# Patient Record
Sex: MALE | Race: WHITE | NOT HISPANIC OR LATINO | URBAN - METROPOLITAN AREA
[De-identification: names, ages, dates, MRNs, and addresses within clinical notes are randomized per-mention and may not be internally consistent; named-entity substitution may affect disease eponyms.]

---

## 2017-01-06 ENCOUNTER — AMBULATORY - HEALTHEAST (OUTPATIENT)
Dept: GERIATRICS | Facility: CLINIC | Age: 82
End: 2017-01-06

## 2017-01-25 ENCOUNTER — OFFICE VISIT - HEALTHEAST (OUTPATIENT)
Dept: GERIATRICS | Facility: CLINIC | Age: 82
End: 2017-01-25

## 2017-01-25 DIAGNOSIS — E55.9 VITAMIN D DEFICIENCY: ICD-10-CM

## 2017-01-25 DIAGNOSIS — G62.9 NEUROPATHY: ICD-10-CM

## 2017-01-25 DIAGNOSIS — S32.409D: ICD-10-CM

## 2017-01-25 DIAGNOSIS — G30.1 LATE ONSET ALZHEIMER'S DISEASE WITHOUT BEHAVIORAL DISTURBANCE (H): ICD-10-CM

## 2017-01-25 DIAGNOSIS — N40.0 ENLARGED PROSTATE: ICD-10-CM

## 2017-01-25 DIAGNOSIS — F02.80 LATE ONSET ALZHEIMER'S DISEASE WITHOUT BEHAVIORAL DISTURBANCE (H): ICD-10-CM

## 2017-02-08 ENCOUNTER — OFFICE VISIT - HEALTHEAST (OUTPATIENT)
Dept: GERIATRICS | Facility: CLINIC | Age: 82
End: 2017-02-08

## 2017-02-08 DIAGNOSIS — F02.80 LATE ONSET ALZHEIMER'S DISEASE WITHOUT BEHAVIORAL DISTURBANCE (H): ICD-10-CM

## 2017-02-08 DIAGNOSIS — M81.0 OP (OSTEOPOROSIS): ICD-10-CM

## 2017-02-08 DIAGNOSIS — S32.409D: ICD-10-CM

## 2017-02-08 DIAGNOSIS — E78.2 COMBINED FAT AND CARBOHYDRATE INDUCED HYPERLIPEMIA: ICD-10-CM

## 2017-02-08 DIAGNOSIS — I10 ESSENTIAL HYPERTENSION WITH GOAL BLOOD PRESSURE LESS THAN 140/90: ICD-10-CM

## 2017-02-08 DIAGNOSIS — M15.0 PRIMARY OSTEOARTHRITIS INVOLVING MULTIPLE JOINTS: ICD-10-CM

## 2017-02-08 DIAGNOSIS — G30.1 LATE ONSET ALZHEIMER'S DISEASE WITHOUT BEHAVIORAL DISTURBANCE (H): ICD-10-CM
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
## 2017-03-22 ENCOUNTER — OFFICE VISIT - HEALTHEAST (OUTPATIENT)
Dept: GERIATRICS | Facility: CLINIC | Age: 82
End: 2017-03-22

## 2017-03-22 DIAGNOSIS — M25.519: ICD-10-CM

## 2017-03-22 DIAGNOSIS — I10 ESSENTIAL HYPERTENSION WITH GOAL BLOOD PRESSURE LESS THAN 140/90: ICD-10-CM

## 2017-03-22 DIAGNOSIS — S32.409D: ICD-10-CM

## 2017-03-22 DIAGNOSIS — N40.0 ENLARGED PROSTATE: ICD-10-CM

## 2017-03-22 DIAGNOSIS — F02.80 LATE ONSET ALZHEIMER'S DISEASE WITHOUT BEHAVIORAL DISTURBANCE (H): ICD-10-CM

## 2017-03-22 DIAGNOSIS — G30.1 LATE ONSET ALZHEIMER'S DISEASE WITHOUT BEHAVIORAL DISTURBANCE (H): ICD-10-CM

## 2017-03-22 DIAGNOSIS — R29.898 LEG WEAKNESS: ICD-10-CM

## 2017-04-12 ENCOUNTER — OFFICE VISIT - HEALTHEAST (OUTPATIENT)
Dept: GERIATRICS | Facility: CLINIC | Age: 82
End: 2017-04-12

## 2017-04-12 DIAGNOSIS — M15.0 PRIMARY OSTEOARTHRITIS INVOLVING MULTIPLE JOINTS: ICD-10-CM

## 2017-04-12 DIAGNOSIS — M81.0 OP (OSTEOPOROSIS): ICD-10-CM

## 2017-04-12 DIAGNOSIS — G30.1 LATE ONSET ALZHEIMER'S DISEASE WITHOUT BEHAVIORAL DISTURBANCE (H): ICD-10-CM

## 2017-04-12 DIAGNOSIS — E78.2 COMBINED FAT AND CARBOHYDRATE INDUCED HYPERLIPEMIA: ICD-10-CM

## 2017-04-12 DIAGNOSIS — F02.80 LATE ONSET ALZHEIMER'S DISEASE WITHOUT BEHAVIORAL DISTURBANCE (H): ICD-10-CM

## 2017-04-12 DIAGNOSIS — I10 ESSENTIAL HYPERTENSION WITH GOAL BLOOD PRESSURE LESS THAN 140/90: ICD-10-CM

## 2017-04-12 DIAGNOSIS — S32.409D: ICD-10-CM
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
## 2017-04-20 ENCOUNTER — AMBULATORY - HEALTHEAST (OUTPATIENT)
Dept: GERIATRICS | Facility: CLINIC | Age: 82
End: 2017-04-20

## 2017-04-20 RX ORDER — ALBUTEROL SULFATE 0.83 MG/ML
2.5 SOLUTION RESPIRATORY (INHALATION) EVERY 4 HOURS PRN
Status: SHIPPED | COMMUNITY
Start: 2017-04-20

## 2017-05-24 ENCOUNTER — OFFICE VISIT - HEALTHEAST (OUTPATIENT)
Dept: GERIATRICS | Facility: CLINIC | Age: 82
End: 2017-05-24

## 2017-05-24 DIAGNOSIS — G30.1 LATE ONSET ALZHEIMER'S DISEASE WITHOUT BEHAVIORAL DISTURBANCE (H): ICD-10-CM

## 2017-05-24 DIAGNOSIS — G62.9 NEUROPATHY: ICD-10-CM

## 2017-05-24 DIAGNOSIS — I10 ESSENTIAL HYPERTENSION WITH GOAL BLOOD PRESSURE LESS THAN 140/90: ICD-10-CM

## 2017-05-24 DIAGNOSIS — M15.0 PRIMARY OSTEOARTHRITIS INVOLVING MULTIPLE JOINTS: ICD-10-CM

## 2017-05-24 DIAGNOSIS — S32.409D: ICD-10-CM

## 2017-05-24 DIAGNOSIS — F02.80 LATE ONSET ALZHEIMER'S DISEASE WITHOUT BEHAVIORAL DISTURBANCE (H): ICD-10-CM

## 2017-06-14 ENCOUNTER — OFFICE VISIT - HEALTHEAST (OUTPATIENT)
Dept: GERIATRICS | Facility: CLINIC | Age: 82
End: 2017-06-14

## 2017-06-14 DIAGNOSIS — S32.409D: ICD-10-CM

## 2017-06-14 DIAGNOSIS — F02.80 LATE ONSET ALZHEIMER'S DISEASE WITHOUT BEHAVIORAL DISTURBANCE (H): ICD-10-CM

## 2017-06-14 DIAGNOSIS — G30.1 LATE ONSET ALZHEIMER'S DISEASE WITHOUT BEHAVIORAL DISTURBANCE (H): ICD-10-CM

## 2017-06-14 DIAGNOSIS — M15.0 PRIMARY OSTEOARTHRITIS INVOLVING MULTIPLE JOINTS: ICD-10-CM

## 2017-06-14 DIAGNOSIS — E78.2 COMBINED FAT AND CARBOHYDRATE INDUCED HYPERLIPEMIA: ICD-10-CM
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
## 2017-06-14 DIAGNOSIS — I10 ESSENTIAL HYPERTENSION WITH GOAL BLOOD PRESSURE LESS THAN 140/90: ICD-10-CM

## 2017-06-14 DIAGNOSIS — M81.0 OP (OSTEOPOROSIS): ICD-10-CM

## 2017-07-26 ENCOUNTER — OFFICE VISIT - HEALTHEAST (OUTPATIENT)
Dept: GERIATRICS | Facility: CLINIC | Age: 82
End: 2017-07-26

## 2017-07-26 DIAGNOSIS — R29.898 LEG WEAKNESS: ICD-10-CM

## 2017-07-26 DIAGNOSIS — F02.80 LATE ONSET ALZHEIMER'S DISEASE WITHOUT BEHAVIORAL DISTURBANCE (H): ICD-10-CM

## 2017-07-26 DIAGNOSIS — G30.1 LATE ONSET ALZHEIMER'S DISEASE WITHOUT BEHAVIORAL DISTURBANCE (H): ICD-10-CM

## 2017-07-26 DIAGNOSIS — M81.0 OP (OSTEOPOROSIS): ICD-10-CM

## 2017-07-26 DIAGNOSIS — I10 ESSENTIAL HYPERTENSION WITH GOAL BLOOD PRESSURE LESS THAN 140/90: ICD-10-CM

## 2017-08-09 ENCOUNTER — OFFICE VISIT - HEALTHEAST (OUTPATIENT)
Dept: GERIATRICS | Facility: CLINIC | Age: 82
End: 2017-08-09

## 2017-08-09 DIAGNOSIS — N40.0 ENLARGED PROSTATE: ICD-10-CM

## 2017-08-09 DIAGNOSIS — F02.80 LATE ONSET ALZHEIMER'S DISEASE WITHOUT BEHAVIORAL DISTURBANCE (H): ICD-10-CM

## 2017-08-09 DIAGNOSIS — M15.0 PRIMARY OSTEOARTHRITIS INVOLVING MULTIPLE JOINTS: ICD-10-CM

## 2017-08-09 DIAGNOSIS — D12.6 BENIGN NEOPLASM OF COLON, UNSPECIFIED PART OF COLON: ICD-10-CM

## 2017-08-09 DIAGNOSIS — G30.1 LATE ONSET ALZHEIMER'S DISEASE WITHOUT BEHAVIORAL DISTURBANCE (H): ICD-10-CM

## 2017-08-09 DIAGNOSIS — E78.2 COMBINED FAT AND CARBOHYDRATE INDUCED HYPERLIPEMIA: ICD-10-CM
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
## 2017-08-09 DIAGNOSIS — S32.409D: ICD-10-CM

## 2017-08-09 DIAGNOSIS — M81.0 OP (OSTEOPOROSIS): ICD-10-CM

## 2017-08-09 DIAGNOSIS — I65.29: ICD-10-CM

## 2017-08-09 DIAGNOSIS — R29.898 LEG WEAKNESS: ICD-10-CM

## 2017-08-09 DIAGNOSIS — I10 ESSENTIAL HYPERTENSION WITH GOAL BLOOD PRESSURE LESS THAN 140/90: ICD-10-CM

## 2017-09-27 ENCOUNTER — OFFICE VISIT - HEALTHEAST (OUTPATIENT)
Dept: GERIATRICS | Facility: CLINIC | Age: 82
End: 2017-09-27
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
## 2017-09-27 DIAGNOSIS — M10.9 GOUT: ICD-10-CM

## 2017-09-27 DIAGNOSIS — S32.409D: ICD-10-CM

## 2017-09-27 DIAGNOSIS — D12.6 BENIGN NEOPLASM OF COLON, UNSPECIFIED PART OF COLON: ICD-10-CM

## 2017-09-27 DIAGNOSIS — R21 RASH: ICD-10-CM

## 2017-09-27 DIAGNOSIS — M15.0 PRIMARY OSTEOARTHRITIS INVOLVING MULTIPLE JOINTS: ICD-10-CM

## 2017-10-11 ENCOUNTER — OFFICE VISIT - HEALTHEAST (OUTPATIENT)
Dept: GERIATRICS | Facility: CLINIC | Age: 82
End: 2017-10-11

## 2017-10-11 DIAGNOSIS — G30.1 LATE ONSET ALZHEIMER'S DISEASE WITHOUT BEHAVIORAL DISTURBANCE (H): ICD-10-CM

## 2017-10-11 DIAGNOSIS — F02.80 LATE ONSET ALZHEIMER'S DISEASE WITHOUT BEHAVIORAL DISTURBANCE (H): ICD-10-CM

## 2017-10-11 DIAGNOSIS — E78.2 COMBINED FAT AND CARBOHYDRATE INDUCED HYPERLIPEMIA: ICD-10-CM

## 2017-10-11 DIAGNOSIS — R29.898 WEAKNESS OF LOWER EXTREMITY, UNSPECIFIED LATERALITY: ICD-10-CM

## 2017-10-11 DIAGNOSIS — D12.6 BENIGN NEOPLASM OF COLON, UNSPECIFIED PART OF COLON: ICD-10-CM

## 2017-10-11 DIAGNOSIS — M15.0 PRIMARY OSTEOARTHRITIS INVOLVING MULTIPLE JOINTS: ICD-10-CM
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
## 2017-10-11 DIAGNOSIS — M81.0 AGE-RELATED OSTEOPOROSIS WITHOUT CURRENT PATHOLOGICAL FRACTURE: ICD-10-CM

## 2017-10-11 DIAGNOSIS — S32.409D CLOSED NONDISPLACED FRACTURE OF ACETABULUM WITH ROUTINE HEALING, UNSPECIFIED PORTION OF ACETABULUM, UNSPECIFIED LATERALITY, SUBSEQUENT ENCOUNTER: ICD-10-CM

## 2017-10-11 DIAGNOSIS — N40.0 ENLARGED PROSTATE: ICD-10-CM

## 2017-10-11 DIAGNOSIS — L30.9 DIFFUSE DERMATITIS: ICD-10-CM

## 2017-10-11 DIAGNOSIS — I10 ESSENTIAL HYPERTENSION: ICD-10-CM

## 2017-10-11 DIAGNOSIS — I65.29 STENOSIS OF CAROTID ARTERY, UNSPECIFIED LATERALITY: ICD-10-CM

## 2017-10-18 ENCOUNTER — AMBULATORY - HEALTHEAST (OUTPATIENT)
Dept: GERIATRICS | Facility: CLINIC | Age: 82
End: 2017-10-18

## 2017-11-20 ENCOUNTER — AMBULATORY - HEALTHEAST (OUTPATIENT)
Dept: GERIATRICS | Facility: CLINIC | Age: 82
End: 2017-11-20

## 2017-11-20 RX ORDER — FUROSEMIDE 20 MG
40 TABLET ORAL DAILY
Status: SHIPPED | COMMUNITY
Start: 2018-01-01

## 2017-11-21 RX ORDER — CETIRIZINE HYDROCHLORIDE 10 MG/1
10 TABLET ORAL DAILY
Status: SHIPPED | COMMUNITY
Start: 2017-11-21

## 2017-11-21 RX ORDER — CALAMINE
1 LOTION (ML) TOPICAL 3 TIMES DAILY PRN
Status: SHIPPED | COMMUNITY
Start: 2017-11-21

## 2017-11-22 ENCOUNTER — OFFICE VISIT - HEALTHEAST (OUTPATIENT)
Dept: GERIATRICS | Facility: CLINIC | Age: 82
End: 2017-11-22

## 2017-11-22 DIAGNOSIS — R29.898 WEAKNESS OF LOWER EXTREMITY, UNSPECIFIED LATERALITY: ICD-10-CM

## 2017-11-22 DIAGNOSIS — R63.5 WEIGHT GAIN: ICD-10-CM

## 2017-11-22 DIAGNOSIS — R06.2 WHEEZING: ICD-10-CM

## 2017-11-22 DIAGNOSIS — S32.409D CLOSED NONDISPLACED FRACTURE OF ACETABULUM WITH ROUTINE HEALING, UNSPECIFIED PORTION OF ACETABULUM, UNSPECIFIED LATERALITY, SUBSEQUENT ENCOUNTER: ICD-10-CM

## 2017-12-07 ENCOUNTER — AMBULATORY - HEALTHEAST (OUTPATIENT)
Dept: GERIATRICS | Facility: CLINIC | Age: 82
End: 2017-12-07

## 2017-12-13 ENCOUNTER — OFFICE VISIT - HEALTHEAST (OUTPATIENT)
Dept: GERIATRICS | Facility: CLINIC | Age: 82
End: 2017-12-13

## 2017-12-13 DIAGNOSIS — M15.0 PRIMARY OSTEOARTHRITIS INVOLVING MULTIPLE JOINTS: ICD-10-CM

## 2017-12-13 DIAGNOSIS — N40.0 ENLARGED PROSTATE: ICD-10-CM

## 2017-12-13 DIAGNOSIS — D12.6 BENIGN NEOPLASM OF COLON, UNSPECIFIED PART OF COLON: ICD-10-CM

## 2017-12-13 DIAGNOSIS — R29.898 WEAKNESS OF LOWER EXTREMITY, UNSPECIFIED LATERALITY: ICD-10-CM

## 2017-12-13 DIAGNOSIS — I65.29 STENOSIS OF CAROTID ARTERY, UNSPECIFIED LATERALITY: ICD-10-CM

## 2017-12-13 DIAGNOSIS — G30.1 LATE ONSET ALZHEIMER'S DISEASE WITHOUT BEHAVIORAL DISTURBANCE (H): ICD-10-CM

## 2017-12-13 DIAGNOSIS — L23.9 ALLERGIC DERMATITIS: ICD-10-CM

## 2017-12-13 DIAGNOSIS — F02.80 LATE ONSET ALZHEIMER'S DISEASE WITHOUT BEHAVIORAL DISTURBANCE (H): ICD-10-CM

## 2017-12-13 DIAGNOSIS — I10 ESSENTIAL HYPERTENSION: ICD-10-CM

## 2017-12-13 DIAGNOSIS — E78.2 COMBINED FAT AND CARBOHYDRATE INDUCED HYPERLIPEMIA: ICD-10-CM

## 2017-12-13 DIAGNOSIS — M81.0 AGE-RELATED OSTEOPOROSIS WITHOUT CURRENT PATHOLOGICAL FRACTURE: ICD-10-CM
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
## 2018-01-01 ENCOUNTER — COMMUNICATION - HEALTHEAST (OUTPATIENT)
Dept: GERIATRICS | Facility: CLINIC | Age: 83
End: 2018-01-01

## 2018-01-01 ENCOUNTER — RECORDS - HEALTHEAST (OUTPATIENT)
Dept: LAB | Facility: CLINIC | Age: 83
End: 2018-01-01

## 2018-01-01 ENCOUNTER — OFFICE VISIT - HEALTHEAST (OUTPATIENT)
Dept: GERIATRICS | Facility: CLINIC | Age: 83
End: 2018-01-01

## 2018-01-01 ENCOUNTER — AMBULATORY - HEALTHEAST (OUTPATIENT)
Dept: ADMINISTRATIVE | Facility: CLINIC | Age: 83
End: 2018-01-01

## 2018-01-01 ENCOUNTER — OFFICE VISIT - HEALTHEAST (OUTPATIENT)
Dept: VASCULAR SURGERY | Facility: CLINIC | Age: 83
End: 2018-01-01

## 2018-01-01 ENCOUNTER — RECORDS - HEALTHEAST (OUTPATIENT)
Dept: ADMINISTRATIVE | Facility: OTHER | Age: 83
End: 2018-01-01

## 2018-01-01 ENCOUNTER — COMMUNICATION - HEALTHEAST (OUTPATIENT)
Dept: VASCULAR SURGERY | Facility: CLINIC | Age: 83
End: 2018-01-01

## 2018-01-01 ENCOUNTER — COMMUNICATION - HEALTHEAST (OUTPATIENT)
Dept: TELEHEALTH | Facility: CLINIC | Age: 83
End: 2018-01-01

## 2018-01-01 DIAGNOSIS — G30.1 LATE ONSET ALZHEIMER'S DISEASE WITHOUT BEHAVIORAL DISTURBANCE (H): ICD-10-CM

## 2018-01-01 DIAGNOSIS — R13.19 OTHER DYSPHAGIA: ICD-10-CM

## 2018-01-01 DIAGNOSIS — R54 ADVANCED AGE: ICD-10-CM

## 2018-01-01 DIAGNOSIS — M00.9: ICD-10-CM

## 2018-01-01 DIAGNOSIS — I73.9 PVD (PERIPHERAL VASCULAR DISEASE) (H): ICD-10-CM

## 2018-01-01 DIAGNOSIS — L97.519 ULCER OF TOE OF RIGHT FOOT, UNSPECIFIED ULCER STAGE (H): ICD-10-CM

## 2018-01-01 DIAGNOSIS — R33.9 URINARY RETENTION: ICD-10-CM

## 2018-01-01 DIAGNOSIS — R06.2 WHEEZING: ICD-10-CM

## 2018-01-01 DIAGNOSIS — L97.501 SKIN ULCER OF TOE, LIMITED TO BREAKDOWN OF SKIN, UNSPECIFIED LATERALITY (H): ICD-10-CM

## 2018-01-01 DIAGNOSIS — M15.0 PRIMARY OSTEOARTHRITIS INVOLVING MULTIPLE JOINTS: ICD-10-CM

## 2018-01-01 DIAGNOSIS — I10 ESSENTIAL HYPERTENSION: ICD-10-CM

## 2018-01-01 DIAGNOSIS — I73.9: ICD-10-CM

## 2018-01-01 DIAGNOSIS — I73.9 PAD (PERIPHERAL ARTERY DISEASE) (H): ICD-10-CM

## 2018-01-01 DIAGNOSIS — L97.522 ULCER OF GREAT TOE, LEFT, WITH FAT LAYER EXPOSED (H): ICD-10-CM

## 2018-01-01 DIAGNOSIS — L97.511 SKIN ULCER OF TOE OF RIGHT FOOT, LIMITED TO BREAKDOWN OF SKIN (H): ICD-10-CM

## 2018-01-01 DIAGNOSIS — L97.512 ULCER OF GREAT TOE, RIGHT, WITH FAT LAYER EXPOSED (H): ICD-10-CM

## 2018-01-01 DIAGNOSIS — D64.9 ANEMIA: ICD-10-CM

## 2018-01-01 DIAGNOSIS — M10.9 GOUT: ICD-10-CM

## 2018-01-01 DIAGNOSIS — M20.60: ICD-10-CM

## 2018-01-01 DIAGNOSIS — L97.529 ULCER OF LEFT FOOT, UNSPECIFIED ULCER STAGE (H): ICD-10-CM

## 2018-01-01 DIAGNOSIS — F02.80 LATE ONSET ALZHEIMER'S DISEASE WITHOUT BEHAVIORAL DISTURBANCE (H): ICD-10-CM

## 2018-01-01 DIAGNOSIS — N39.0 URINARY TRACT INFECTION WITHOUT HEMATURIA, SITE UNSPECIFIED: ICD-10-CM

## 2018-01-01 DIAGNOSIS — R09.89 ABNORMAL FOOT PULSE: ICD-10-CM

## 2018-01-01 DIAGNOSIS — K56.7 ILEUS OF UNSPECIFIED TYPE (H): ICD-10-CM

## 2018-01-01 DIAGNOSIS — S91.309A MULTIPLE OPEN WOUNDS OF FOOT: ICD-10-CM

## 2018-01-01 DIAGNOSIS — R21 RASH: ICD-10-CM

## 2018-01-01 DIAGNOSIS — K59.00 CONSTIPATION, UNSPECIFIED CONSTIPATION TYPE: ICD-10-CM

## 2018-01-01 DIAGNOSIS — M1A.0791 IDIOPATHIC CHRONIC GOUT OF FOOT WITH TOPHUS, UNSPECIFIED LATERALITY: ICD-10-CM

## 2018-01-01 DIAGNOSIS — B37.0 THRUSH: ICD-10-CM

## 2018-01-01 DIAGNOSIS — R73.9 HYPERGLYCEMIA: ICD-10-CM

## 2018-01-01 DIAGNOSIS — G62.9 NEUROPATHY: ICD-10-CM

## 2018-01-01 DIAGNOSIS — L97.522 ULCER OF LEFT SECOND TOE, WITH FAT LAYER EXPOSED (H): ICD-10-CM

## 2018-01-01 LAB
ALBUMIN UR-MCNC: ABNORMAL MG/DL
ANION GAP SERPL CALCULATED.3IONS-SCNC: 6 MMOL/L (ref 5–18)
APPEARANCE UR: ABNORMAL
BACTERIA #/AREA URNS HPF: ABNORMAL HPF
BACTERIA SPEC CULT: ABNORMAL
BACTERIA SPEC CULT: ABNORMAL
BILIRUB UR QL STRIP: NEGATIVE
BNP SERPL-MCNC: 36 PG/ML (ref 0–93)
BUN SERPL-MCNC: 30 MG/DL (ref 8–28)
CALCIUM SERPL-MCNC: 9 MG/DL (ref 8.5–10.5)
CHLORIDE BLD-SCNC: 106 MMOL/L (ref 98–107)
CO2 SERPL-SCNC: 27 MMOL/L (ref 22–31)
COLOR UR AUTO: YELLOW
CREAT SERPL-MCNC: 1.19 MG/DL (ref 0.7–1.3)
ERYTHROCYTE [DISTWIDTH] IN BLOOD BY AUTOMATED COUNT: 12.9 % (ref 11–14.5)
GFR SERPL CREATININE-BSD FRML MDRD: 57 ML/MIN/1.73M2
GLUCOSE BLD-MCNC: 147 MG/DL (ref 70–125)
GLUCOSE UR STRIP-MCNC: ABNORMAL MG/DL
HBA1C MFR BLD: 13.8 % (ref 4.2–6.1)
HCT VFR BLD AUTO: 37.3 % (ref 40–54)
HGB BLD-MCNC: 12.1 G/DL (ref 14–18)
HGB BLD-MCNC: 12.6 G/DL (ref 14–18)
HGB UR QL STRIP: ABNORMAL
KETONES UR STRIP-MCNC: ABNORMAL MG/DL
LEUKOCYTE ESTERASE UR QL STRIP: ABNORMAL
MCH RBC QN AUTO: 29.5 PG (ref 27–34)
MCHC RBC AUTO-ENTMCNC: 32.4 G/DL (ref 32–36)
MCV RBC AUTO: 91 FL (ref 80–100)
NITRATE UR QL: POSITIVE
PH UR STRIP: 5.5 [PH] (ref 4.5–8)
PLATELET # BLD AUTO: 186 THOU/UL (ref 140–440)
PMV BLD AUTO: 9.4 FL (ref 8.5–12.5)
POTASSIUM BLD-SCNC: 4.5 MMOL/L (ref 3.5–5)
RBC # BLD AUTO: 4.1 MILL/UL (ref 4.4–6.2)
RBC #/AREA URNS AUTO: ABNORMAL HPF
SODIUM SERPL-SCNC: 139 MMOL/L (ref 136–145)
SP GR UR STRIP: 1.03 (ref 1–1.03)
SQUAMOUS #/AREA URNS AUTO: ABNORMAL LPF
URATE SERPL-MCNC: 7.2 MG/DL (ref 3–8)
UROBILINOGEN UR STRIP-ACNC: ABNORMAL
WBC #/AREA URNS AUTO: >100 HPF
WBC CLUMPS #/AREA URNS HPF: PRESENT /[HPF]
WBC: 7 THOU/UL (ref 4–11)

## 2018-01-01 RX ORDER — ALLOPURINOL 100 MG/1
100 TABLET ORAL DAILY
Status: SHIPPED | COMMUNITY
Start: 2018-01-01

## 2018-01-01 RX ORDER — ONDANSETRON 4 MG/1
4 TABLET, FILM COATED ORAL EVERY 6 HOURS PRN
Status: SHIPPED | COMMUNITY
Start: 2018-01-01

## 2018-01-01 RX ORDER — INSULIN GLARGINE 100 [IU]/ML
12 INJECTION, SOLUTION SUBCUTANEOUS AT BEDTIME
Status: SHIPPED | COMMUNITY
Start: 2018-01-01

## 2018-01-01 RX ORDER — MAGNESIUM HYDROXIDE/ALUMINUM HYDROXICE/SIMETHICONE 120; 1200; 1200 MG/30ML; MG/30ML; MG/30ML
30 SUSPENSION ORAL
Status: SHIPPED | COMMUNITY
Start: 2018-01-01

## 2018-01-01 ASSESSMENT — MIFFLIN-ST. JEOR: SCORE: 1809.2

## 2018-01-24 ENCOUNTER — OFFICE VISIT - HEALTHEAST (OUTPATIENT)
Dept: GERIATRICS | Facility: CLINIC | Age: 83
End: 2018-01-24

## 2018-01-24 DIAGNOSIS — G30.1 LATE ONSET ALZHEIMER'S DISEASE WITHOUT BEHAVIORAL DISTURBANCE (H): ICD-10-CM

## 2018-01-24 DIAGNOSIS — I10 ESSENTIAL HYPERTENSION: ICD-10-CM

## 2018-01-24 DIAGNOSIS — R29.898 WEAKNESS OF LOWER EXTREMITY, UNSPECIFIED LATERALITY: ICD-10-CM

## 2018-01-24 DIAGNOSIS — E78.2 COMBINED FAT AND CARBOHYDRATE INDUCED HYPERLIPEMIA: ICD-10-CM

## 2018-01-24 DIAGNOSIS — F02.80 LATE ONSET ALZHEIMER'S DISEASE WITHOUT BEHAVIORAL DISTURBANCE (H): ICD-10-CM

## 2018-01-24 DIAGNOSIS — M10.9 GOUT: ICD-10-CM

## 2018-01-25 ENCOUNTER — RECORDS - HEALTHEAST (OUTPATIENT)
Dept: LAB | Facility: CLINIC | Age: 83
End: 2018-01-25

## 2018-01-25 LAB
BNP SERPL-MCNC: 43 PG/ML (ref 0–93)
ERYTHROCYTE [DISTWIDTH] IN BLOOD BY AUTOMATED COUNT: 13.2 % (ref 11–14.5)
HCT VFR BLD AUTO: 34.3 % (ref 40–54)
HGB BLD-MCNC: 11 G/DL (ref 14–18)
MCH RBC QN AUTO: 29.3 PG (ref 27–34)
MCHC RBC AUTO-ENTMCNC: 32.1 G/DL (ref 32–36)
MCV RBC AUTO: 92 FL (ref 80–100)
PLATELET # BLD AUTO: 184 THOU/UL (ref 140–440)
PMV BLD AUTO: 9.2 FL (ref 8.5–12.5)
RBC # BLD AUTO: 3.75 MILL/UL (ref 4.4–6.2)
WBC: 8 THOU/UL (ref 4–11)

## 2018-02-01 ENCOUNTER — OFFICE VISIT - HEALTHEAST (OUTPATIENT)
Dept: GERIATRICS | Facility: CLINIC | Age: 83
End: 2018-02-01

## 2018-02-01 DIAGNOSIS — L30.9 DERMATITIS: ICD-10-CM

## 2018-02-01 DIAGNOSIS — L97.529 ULCERS OF BOTH GREAT TOES (H): ICD-10-CM

## 2018-02-01 DIAGNOSIS — F02.80 LATE ONSET ALZHEIMER'S DISEASE WITHOUT BEHAVIORAL DISTURBANCE (H): ICD-10-CM

## 2018-02-01 DIAGNOSIS — G30.1 LATE ONSET ALZHEIMER'S DISEASE WITHOUT BEHAVIORAL DISTURBANCE (H): ICD-10-CM

## 2018-02-01 DIAGNOSIS — M10.9 GOUT: ICD-10-CM

## 2018-02-01 DIAGNOSIS — L97.519 ULCERS OF BOTH GREAT TOES (H): ICD-10-CM

## 2018-02-01 DIAGNOSIS — I10 ESSENTIAL HYPERTENSION: ICD-10-CM

## 2018-02-01 DIAGNOSIS — R33.9 URINARY RETENTION: ICD-10-CM

## 2018-02-01 DIAGNOSIS — S32.409A ACETABULAR FRACTURE (H): ICD-10-CM

## 2018-02-02 ENCOUNTER — RECORDS - HEALTHEAST (OUTPATIENT)
Dept: LAB | Facility: CLINIC | Age: 83
End: 2018-02-02

## 2018-02-03 RX ORDER — MELOXICAM 7.5 MG/1
7.5 TABLET ORAL DAILY
Qty: 90 TABLET | Refills: 0 | Status: SHIPPED
Start: 2018-02-03

## 2018-02-04 ENCOUNTER — RECORDS - HEALTHEAST (OUTPATIENT)
Dept: LAB | Facility: CLINIC | Age: 83
End: 2018-02-04

## 2018-02-04 LAB
FLUAV AG SPEC QL IA: NORMAL
FLUBV AG SPEC QL IA: NORMAL

## 2018-02-05 ENCOUNTER — AMBULATORY - HEALTHEAST (OUTPATIENT)
Dept: GERIATRICS | Facility: CLINIC | Age: 83
End: 2018-02-05

## 2018-02-05 ENCOUNTER — RECORDS - HEALTHEAST (OUTPATIENT)
Dept: LAB | Facility: CLINIC | Age: 83
End: 2018-02-05

## 2018-02-05 LAB
ALBUMIN UR-MCNC: ABNORMAL MG/DL
AMORPH CRY #/AREA URNS HPF: ABNORMAL /[HPF]
APPEARANCE UR: ABNORMAL
BACTERIA #/AREA URNS HPF: ABNORMAL HPF
BILIRUB UR QL STRIP: NEGATIVE
COLOR UR AUTO: YELLOW
ERYTHROCYTE [DISTWIDTH] IN BLOOD BY AUTOMATED COUNT: 13.8 % (ref 11–14.5)
GLUCOSE UR STRIP-MCNC: NEGATIVE MG/DL
GRAN CASTS #/AREA URNS LPF: ABNORMAL LPF
HCT VFR BLD AUTO: 36.5 % (ref 40–54)
HGB BLD-MCNC: 11.6 G/DL (ref 14–18)
HGB UR QL STRIP: ABNORMAL
KETONES UR STRIP-MCNC: NEGATIVE MG/DL
LEUKOCYTE ESTERASE UR QL STRIP: ABNORMAL
MCH RBC QN AUTO: 29.6 PG (ref 27–34)
MCHC RBC AUTO-ENTMCNC: 31.8 G/DL (ref 32–36)
MCV RBC AUTO: 93 FL (ref 80–100)
MUCOUS THREADS #/AREA URNS LPF: ABNORMAL LPF
NITRATE UR QL: NEGATIVE
PH UR STRIP: 5.5 [PH] (ref 4.5–8)
PLATELET # BLD AUTO: 202 THOU/UL (ref 140–440)
PMV BLD AUTO: 9.2 FL (ref 8.5–12.5)
RBC # BLD AUTO: 3.92 MILL/UL (ref 4.4–6.2)
RBC #/AREA URNS AUTO: ABNORMAL HPF
SP GR UR STRIP: 1.02 (ref 1–1.03)
SQUAMOUS #/AREA URNS AUTO: ABNORMAL LPF
URATE SERPL-MCNC: 7.8 MG/DL (ref 3–8)
UROBILINOGEN UR STRIP-ACNC: ABNORMAL
WBC #/AREA URNS AUTO: ABNORMAL HPF
WBC: 14.8 THOU/UL (ref 4–11)

## 2018-02-06 LAB — BACTERIA SPEC CULT: NO GROWTH

## 2018-02-17 ENCOUNTER — RECORDS - HEALTHEAST (OUTPATIENT)
Dept: LAB | Facility: CLINIC | Age: 83
End: 2018-02-17

## 2018-02-19 LAB
ANION GAP SERPL CALCULATED.3IONS-SCNC: 7 MMOL/L (ref 5–18)
BUN SERPL-MCNC: 31 MG/DL (ref 8–28)
CALCIUM SERPL-MCNC: 8.8 MG/DL (ref 8.5–10.5)
CHLORIDE BLD-SCNC: 106 MMOL/L (ref 98–107)
CO2 SERPL-SCNC: 24 MMOL/L (ref 22–31)
CREAT SERPL-MCNC: 1 MG/DL (ref 0.7–1.3)
GFR SERPL CREATININE-BSD FRML MDRD: >60 ML/MIN/1.73M2
GLUCOSE BLD-MCNC: 118 MG/DL (ref 70–125)
POTASSIUM BLD-SCNC: 4.8 MMOL/L (ref 3.5–5)
SODIUM SERPL-SCNC: 137 MMOL/L (ref 136–145)

## 2018-02-28 ENCOUNTER — RECORDS - HEALTHEAST (OUTPATIENT)
Dept: ADMINISTRATIVE | Facility: OTHER | Age: 83
End: 2018-02-28

## 2018-03-21 ENCOUNTER — OFFICE VISIT - HEALTHEAST (OUTPATIENT)
Dept: GERIATRICS | Facility: CLINIC | Age: 83
End: 2018-03-21

## 2018-03-21 DIAGNOSIS — G30.1 LATE ONSET ALZHEIMER'S DISEASE WITHOUT BEHAVIORAL DISTURBANCE (H): ICD-10-CM

## 2018-03-21 DIAGNOSIS — F02.80 LATE ONSET ALZHEIMER'S DISEASE WITHOUT BEHAVIORAL DISTURBANCE (H): ICD-10-CM

## 2018-03-21 DIAGNOSIS — I10 ESSENTIAL HYPERTENSION: ICD-10-CM

## 2018-03-21 DIAGNOSIS — R33.9 URINARY RETENTION: ICD-10-CM

## 2018-03-21 DIAGNOSIS — R06.2 WHEEZING: ICD-10-CM

## 2018-03-21 DIAGNOSIS — L23.9 ALLERGIC DERMATITIS: ICD-10-CM

## 2018-03-21 RX ORDER — CLOBETASOL PROPIONATE 0.5 MG/G
1 CREAM TOPICAL 2 TIMES DAILY PRN
Status: SHIPPED | COMMUNITY
Start: 2018-03-21

## 2018-04-05 ENCOUNTER — OFFICE VISIT - HEALTHEAST (OUTPATIENT)
Dept: GERIATRICS | Facility: CLINIC | Age: 83
End: 2018-04-05

## 2018-04-05 DIAGNOSIS — R13.19 OTHER DYSPHAGIA: ICD-10-CM

## 2018-04-05 DIAGNOSIS — F02.80 LATE ONSET ALZHEIMER'S DISEASE WITHOUT BEHAVIORAL DISTURBANCE (H): ICD-10-CM

## 2018-04-05 DIAGNOSIS — I10 ESSENTIAL HYPERTENSION: ICD-10-CM

## 2018-04-05 DIAGNOSIS — R06.2 WHEEZING: ICD-10-CM

## 2018-04-05 DIAGNOSIS — G30.1 LATE ONSET ALZHEIMER'S DISEASE WITHOUT BEHAVIORAL DISTURBANCE (H): ICD-10-CM

## 2018-04-11 ENCOUNTER — RECORDS - HEALTHEAST (OUTPATIENT)
Dept: LAB | Facility: CLINIC | Age: 83
End: 2018-04-11

## 2018-04-12 LAB
ERYTHROCYTE [DISTWIDTH] IN BLOOD BY AUTOMATED COUNT: 14.1 % (ref 11–14.5)
HCT VFR BLD AUTO: 35.9 % (ref 40–54)
HGB BLD-MCNC: 11.7 G/DL (ref 14–18)
MCH RBC QN AUTO: 29.7 PG (ref 27–34)
MCHC RBC AUTO-ENTMCNC: 32.6 G/DL (ref 32–36)
MCV RBC AUTO: 91 FL (ref 80–100)
PLATELET # BLD AUTO: 184 THOU/UL (ref 140–440)
PMV BLD AUTO: 9.2 FL (ref 8.5–12.5)
RBC # BLD AUTO: 3.94 MILL/UL (ref 4.4–6.2)
TSH SERPL DL<=0.005 MIU/L-ACNC: 4.4 UIU/ML (ref 0.3–5)
WBC: 8.5 THOU/UL (ref 4–11)

## 2018-05-16 ENCOUNTER — OFFICE VISIT - HEALTHEAST (OUTPATIENT)
Dept: GERIATRICS | Facility: CLINIC | Age: 83
End: 2018-05-16

## 2018-05-16 DIAGNOSIS — M15.0 PRIMARY OSTEOARTHRITIS INVOLVING MULTIPLE JOINTS: ICD-10-CM

## 2018-05-16 DIAGNOSIS — I10 ESSENTIAL HYPERTENSION: ICD-10-CM

## 2018-05-16 DIAGNOSIS — R13.19 OTHER DYSPHAGIA: ICD-10-CM

## 2018-05-16 DIAGNOSIS — G62.9 NEUROPATHY: ICD-10-CM

## 2018-05-16 DIAGNOSIS — L23.9 ALLERGIC DERMATITIS: ICD-10-CM

## 2018-05-16 RX ORDER — IPRATROPIUM BROMIDE AND ALBUTEROL SULFATE 2.5; .5 MG/3ML; MG/3ML
3 SOLUTION RESPIRATORY (INHALATION) 2 TIMES DAILY
Status: SHIPPED | COMMUNITY
Start: 2018-05-16

## 2019-01-01 ENCOUNTER — AMBULATORY - HEALTHEAST (OUTPATIENT)
Dept: GERIATRICS | Facility: CLINIC | Age: 84
End: 2019-01-01

## 2019-01-01 ENCOUNTER — OFFICE VISIT - HEALTHEAST (OUTPATIENT)
Dept: GERIATRICS | Facility: CLINIC | Age: 84
End: 2019-01-01

## 2019-01-01 ENCOUNTER — COMMUNICATION - HEALTHEAST (OUTPATIENT)
Dept: GERIATRICS | Facility: CLINIC | Age: 84
End: 2019-01-01

## 2019-01-01 ENCOUNTER — RECORDS - HEALTHEAST (OUTPATIENT)
Dept: LAB | Facility: CLINIC | Age: 84
End: 2019-01-01

## 2019-01-01 DIAGNOSIS — M1A.00X0 CHRONIC IDIOPATHIC GOUT: ICD-10-CM

## 2019-01-01 DIAGNOSIS — I73.9 PERIPHERAL VASCULAR DISEASE (H): ICD-10-CM

## 2019-01-01 DIAGNOSIS — R73.9 HYPERGLYCEMIA: ICD-10-CM

## 2019-01-01 DIAGNOSIS — F02.80 LATE ONSET ALZHEIMER'S DISEASE WITHOUT BEHAVIORAL DISTURBANCE (H): ICD-10-CM

## 2019-01-01 DIAGNOSIS — M15.0 PRIMARY OSTEOARTHRITIS INVOLVING MULTIPLE JOINTS: ICD-10-CM

## 2019-01-01 DIAGNOSIS — G30.1 LATE ONSET ALZHEIMER'S DISEASE WITHOUT BEHAVIORAL DISTURBANCE (H): ICD-10-CM

## 2019-01-01 DIAGNOSIS — G62.9 NEUROPATHY: ICD-10-CM

## 2019-01-01 DIAGNOSIS — L30.9 DERMATITIS: ICD-10-CM

## 2019-01-01 DIAGNOSIS — S91.309A MULTIPLE OPEN WOUNDS OF FOOT: ICD-10-CM

## 2019-01-01 DIAGNOSIS — I10 ESSENTIAL HYPERTENSION: ICD-10-CM

## 2019-01-01 DIAGNOSIS — I73.9 PVD (PERIPHERAL VASCULAR DISEASE) (H): ICD-10-CM

## 2019-01-01 DIAGNOSIS — I73.9: ICD-10-CM

## 2019-01-01 DIAGNOSIS — R13.19 OTHER DYSPHAGIA: ICD-10-CM

## 2019-01-01 DIAGNOSIS — R63.4 RECENT WEIGHT LOSS: ICD-10-CM

## 2019-01-01 DIAGNOSIS — R19.7 DIARRHEA, UNSPECIFIED TYPE: ICD-10-CM

## 2019-01-01 DIAGNOSIS — D64.9 ANEMIA, UNSPECIFIED TYPE: ICD-10-CM

## 2019-01-01 LAB
ALBUMIN UR-MCNC: ABNORMAL MG/DL
ANION GAP SERPL CALCULATED.3IONS-SCNC: 11 MMOL/L (ref 5–18)
ANION GAP SERPL CALCULATED.3IONS-SCNC: 13 MMOL/L (ref 5–18)
ANION GAP SERPL CALCULATED.3IONS-SCNC: 13 MMOL/L (ref 5–18)
APPEARANCE UR: ABNORMAL
BACTERIA #/AREA URNS HPF: ABNORMAL HPF
BACTERIA SPEC CULT: ABNORMAL
BILIRUB UR QL STRIP: NEGATIVE
BUN SERPL-MCNC: 39 MG/DL (ref 8–28)
BUN SERPL-MCNC: 42 MG/DL (ref 8–28)
BUN SERPL-MCNC: 44 MG/DL (ref 8–28)
C DIFF TOX B STL QL: NEGATIVE
CALCIUM SERPL-MCNC: 8.3 MG/DL (ref 8.5–10.5)
CALCIUM SERPL-MCNC: 8.9 MG/DL (ref 8.5–10.5)
CALCIUM SERPL-MCNC: 9 MG/DL (ref 8.5–10.5)
CHLORIDE BLD-SCNC: 104 MMOL/L (ref 98–107)
CHLORIDE BLD-SCNC: 110 MMOL/L (ref 98–107)
CHLORIDE BLD-SCNC: 114 MMOL/L (ref 98–107)
CO2 SERPL-SCNC: 15 MMOL/L (ref 22–31)
CO2 SERPL-SCNC: 18 MMOL/L (ref 22–31)
CO2 SERPL-SCNC: 18 MMOL/L (ref 22–31)
COLOR UR AUTO: YELLOW
CREAT SERPL-MCNC: 1.76 MG/DL (ref 0.7–1.3)
CREAT SERPL-MCNC: 1.86 MG/DL (ref 0.7–1.3)
CREAT SERPL-MCNC: 1.93 MG/DL (ref 0.7–1.3)
ERYTHROCYTE [DISTWIDTH] IN BLOOD BY AUTOMATED COUNT: 15.2 % (ref 11–14.5)
GFR SERPL CREATININE-BSD FRML MDRD: 32 ML/MIN/1.73M2
GFR SERPL CREATININE-BSD FRML MDRD: 34 ML/MIN/1.73M2
GFR SERPL CREATININE-BSD FRML MDRD: 36 ML/MIN/1.73M2
GLUCOSE BLD-MCNC: 138 MG/DL (ref 70–125)
GLUCOSE BLD-MCNC: 76 MG/DL (ref 70–125)
GLUCOSE BLD-MCNC: 95 MG/DL (ref 70–125)
GLUCOSE UR STRIP-MCNC: NEGATIVE MG/DL
HBA1C MFR BLD: 6.2 % (ref 4.2–6.1)
HCT VFR BLD AUTO: 40.8 % (ref 40–54)
HGB BLD-MCNC: 13.2 G/DL (ref 14–18)
HGB UR QL STRIP: ABNORMAL
KETONES UR STRIP-MCNC: NEGATIVE MG/DL
LEUKOCYTE ESTERASE UR QL STRIP: ABNORMAL
MAGNESIUM SERPL-MCNC: 1.7 MG/DL (ref 1.8–2.6)
MCH RBC QN AUTO: 29.4 PG (ref 27–34)
MCHC RBC AUTO-ENTMCNC: 32.4 G/DL (ref 32–36)
MCV RBC AUTO: 91 FL (ref 80–100)
NITRATE UR QL: NEGATIVE
PH UR STRIP: 6 [PH] (ref 4.5–8)
PLATELET # BLD AUTO: 236 THOU/UL (ref 140–440)
PMV BLD AUTO: 9.2 FL (ref 8.5–12.5)
POTASSIUM BLD-SCNC: 2.8 MMOL/L (ref 3.5–5)
POTASSIUM BLD-SCNC: 3.1 MMOL/L (ref 3.5–5)
POTASSIUM BLD-SCNC: 4.4 MMOL/L (ref 3.5–5)
RBC # BLD AUTO: 4.49 MILL/UL (ref 4.4–6.2)
RBC #/AREA URNS AUTO: >100 HPF
RIBOTYPE 027/NAP1/BI: NORMAL
SODIUM SERPL-SCNC: 135 MMOL/L (ref 136–145)
SODIUM SERPL-SCNC: 139 MMOL/L (ref 136–145)
SODIUM SERPL-SCNC: 142 MMOL/L (ref 136–145)
SP GR UR STRIP: 1.03 (ref 1–1.03)
SQUAMOUS #/AREA URNS AUTO: ABNORMAL LPF
UROBILINOGEN UR STRIP-ACNC: ABNORMAL
WBC #/AREA URNS AUTO: >100 HPF
WBC CLUMPS #/AREA URNS HPF: PRESENT /[HPF]
WBC: 11.2 THOU/UL (ref 4–11)

## 2019-01-01 RX ORDER — SPIRONOLACTONE 25 MG/1
25 TABLET ORAL DAILY
Status: SHIPPED | COMMUNITY
Start: 2019-01-01

## 2021-05-26 NOTE — PROGRESS NOTES
[facility]    NAME:  Neel Navarro             :  10/12/1920  MRN: [id number]  CODE STATUS:  DNR/DNI    FACILITY:  [facility]  [[id number]]       ROOM:   323    CHIEF COMPLAINT/REASON FOR VISIT:  Chief Complaint   Patient presents with     Review Of Multiple Medical Conditions     HISTORY OF PRESENT ILLNESS: Neel Navarro is a 89 y.o. male with CAD, Late onset Alzheimer's disease, HLD, and Dysphagia is being seen today in the long term care facility for follow up and management of multiple chronic medical conditions.      Today, patient is seen at the bedside.  Patient has a h/o recurrent rash on upper chest, abdomen, and arms with erythema and pruritis. He has a flare up today that is on his abdomen and neck.     He has PVD and has chronic wounds that are looking better since controlling his blood sugars.  Currently on Vistaril at bedtime.  No pain.    Past Medical History:   Diagnosis Date     Allergic dermatitis 2017     Angina pectoris (H) 2003     Benign neoplasm of colon 2006    Overview:  Adenoma  Declines colonoscopy at 89 years old     Bowel obstruction (H)      CAD (coronary artery disease)      Carotid artery narrowing 3/1/2010     Closed nondisplaced fracture of acetabulum with routine healing 2016     Dementia      Dermatitis 2/3/2018     Dysphagia      Enlarged prostate      Essential hypertension      Fall 2016     Gastrointestinal bleeding, upper 2007    Overview:  UGI bleed 2nd to esophagitis +/- Esophageal angioectasia     Gout      HLD (hyperlipidemia)      Late onset Alzheimer's disease without behavioral disturbance 6/10/2010     Leg weakness 2011     Neuropathy (H) 2010     OP (osteoporosis) 2007    Overview:  Osteopenia on DEXA   R Hip Fx      Other dysphagia 2018     Peripheral blood vessel disorder (H) 3/17/2004    Overview:  Epic      Primary osteoarthritis involving  multiple joints 2004    Overview:  Epic      Right bundle branch block      Urinary retention 2/3/2018    Has a chronic peña     Vitamin D deficiency 3/1/2010     Wheezing 3/21/2018     Past Surgical History:   Procedure Laterality Date     REPLACEMENT TOTAL KNEE BILATERAL       Family History   Problem Relation Age of Onset     Other Mother          of old age in her 80s     Heart attack Father          age 78     Heart attack Brother      Breast cancer Daughter      Social History     Socioeconomic History     Marital status:      Spouse name: Not on file     Number of children: Not on file     Years of education: Not on file     Highest education level: Not on file   Occupational History     Occupation:      Comment: retired   Social Needs     Financial resource strain: Not on file     Food insecurity:     Worry: Not on file     Inability: Not on file     Transportation needs:     Medical: Not on file     Non-medical: Not on file   Tobacco Use     Smoking status: Former Smoker     Smokeless tobacco: Never Used   Substance and Sexual Activity     Alcohol use: No     Drug use: No     Sexual activity: No   Lifestyle     Physical activity:     Days per week: Not on file     Minutes per session: Not on file     Stress: Not on file   Relationships     Social connections:     Talks on phone: Not on file     Gets together: Not on file     Attends Adventism service: Not on file     Active member of club or organization: Not on file     Attends meetings of clubs or organizations: Not on file     Relationship status: Not on file     Intimate partner violence:     Fear of current or ex partner: Not on file     Emotionally abused: Not on file     Physically abused: Not on file     Forced sexual activity: Not on file   Other Topics Concern     Not on file   Social History Narrative    He is from [place] and is a retired .  He was  in 2 and has one son  alive, one daughter  of breast cancer. His niece is his main caretaker.     Allergies   Allergen Reactions     Ranitidine Unknown     Current Outpatient Medications   Medication Sig Dispense Refill     acetaminophen (TYLENOL) 500 MG tablet Take 1,000 mg by mouth every 6 (six) hours as needed for pain Not to exceed 3 grams in 24 hours.             albuterol (PROVENTIL) 2.5 mg /3 mL (0.083 %) nebulizer solution Take 2.5 mg by nebulization every 4 (four) hours as needed for wheezing.        allopurinol (ZYLOPRIM) 100 MG tablet Take 100 mg by mouth daily.       aluminum-magnesium hydroxide-simethicone (MAALOX ADVANCED) 200-200-20 mg/5 mL Susp Take 30 mL by mouth every 2 (two) hours as needed.       ascorbic acid (VITAMIN C) 500 MG tablet Take 500 mg by mouth 2 (two) times a day.        bisacodyl (DULCOLAX) 10 mg suppository Insert 10 mg into the rectum daily as needed (constipation).        calamine lotion Apply 1 application topically 3 (three) times a day as needed. Apply to affected areas  Topically as needed for itching after the TMC cream is used        camphor-menthol (SARNA) lotion Apply 1 application topically as needed for itching. Apply to abdomen and chest daily and PRN       carbamide peroxide (DEBROX) 6.5 % otic solution Administer 3 drops into both ears see administration instructions. Instill 3 drops in both ears as needed for ear wax build up twice daily X 3 days. Irrigate using 30 mL syringe and warm water.       cetirizine (ZYRTEC) 10 MG tablet Take 10 mg by mouth daily.       clobetasol (TEMOVATE) 0.05 % cream Apply 1 application topically 2 (two) times a day as needed.       ferrous sulfate 65 mg elemental iron Take 1 tablet by mouth 2 (two) times a day with meals. 325mg daily             fluticasone (FLONASE) 50 mcg/actuation nasal spray 1 spray into each nostril daily.        furosemide (LASIX) 20 MG tablet Take 40 mg by mouth daily.        guaiFENesin (ROBITUSSIN) 100 mg/5 mL syrup Take 200 mg  by mouth 4 (four) times a day.       hydrOXYzine (ATARAX) 25 MG tablet Take 25 mg by mouth at bedtime.       insulin aspart U-100 (NOVOLOG) 100 unit/mL injection Inject under the skin 3 (three) times a day before meals 200-299= 4 units  300-399= 6 units  400-499= 10 units  500-999= 12 units.             insulin aspart U-100 (NOVOLOG) 100 unit/mL injection Inject 10 Units under the skin 3 (three) times a day before meals.       insulin glargine (BASAGLAR KWIKPEN) 100 unit/mL (3 mL) pen Inject 12 Units under the skin at bedtime.       ipratropium-albuterol (DUO-NEB) 0.5-2.5 mg/3 mL nebulizer Inhale 3 mL 2 (two) times a day.        magnesium hydroxide (MAGNESIUM HYDROXIDE) 400 mg/5 mL Susp suspension Take 30 mL by mouth 2 (two) times a day as needed.        meloxicam (MOBIC) 7.5 MG tablet Take 1 tablet (7.5 mg total) by mouth daily. 90 tablet 0     omeprazole (PRILOSEC) 20 MG capsule Take 20 mg by mouth daily.       ondansetron (ZOFRAN) 4 MG tablet Take 4 mg by mouth every 6 (six) hours as needed for nausea.       protein supplement Pack Take 1 packet by mouth 2 (two) times a day.       senna-docusate (PERICOLACE) 8.6-50 mg tablet Take 1 tablet by mouth 2 (two) times a day. Do not administer if loose stools (Patient taking differently: Take 1 tablet by mouth daily. Do not administer if loose stools      )  0     spironolactone (ALDACTONE) 25 MG tablet Take 25 mg by mouth daily.       No current facility-administered medications for this visit.        REVIEW OF SYSTEMS:    Currently, no fever, chills, or rigors. Does not have any visual problems.  Hard of hearing.  Denies any chest pain, headaches, palpitations, lightheadedness, dizziness, shortness of breath, or cough. Appetite is good. Denies any GERD symptoms. Denies any difficulty with nausea or vomiting. Dysphagia. Denies any abdominal pain, diarrhea, or constipation. Urinary retention. No insomnia. No active bleeding. Rash on upper chest, abdomen, and bilateral  arms.      PHYSICAL EXAMINATION:  Vitals:    03/22/19 1029   BP: 116/70   Pulse: 75   Resp: 20   Temp: 98.1  F (36.7  C)   SpO2: 95%   Weight: (!) 234 lb 3.2 oz (106.2 kg)       GENERAL: Awake, Alert, oriented x3, not in any form of acute distress, answers questions appropriately, follows simple commands, conversant  HEENT: Head is normocephalic with normal hair distribution. No evidence of trauma. Ears: No acute purulent discharge. Eyes: Conjunctivae pink with no scleral jaundice. Nose: Normal mucosa and septum. NECK: Supple with no cervical or supraclavicular lymphadenopathy. Trachea is midline.   CHEST: No tenderness or deformity, no crepitus  LUNG: Clear to auscultation with good chest expansion. There are no crackles or wheezes, normal AP diameter.  BACK: No kyphosis of the thoracic spine. Symmetric, no curvature, ROM normal, no CVA tenderness, no spinal tenderness   CVS: There is good S1  S2, there are no murmurs, rubs, gallops, or heaves, rhythm is regular,  2+ pulses symmetric in all extremities.  ABDOMEN: Globular and soft, nontender to palpation, non distended, no masses, no organomegaly, good bowel sounds, no rebound or guarding, no peritoneal signs.   EXTREMITIES:  Decreased range of motion on both upper and lower extremities, there is no tenderness to palpation, pedal edema, no cyanosis or clubbing, no calf tenderness.  Pulses decreased in lower extremities, delayed cap refill, no joint swelling.  Right/left great toe, left foot bunion, 2nd left toe, area between left great toe and 2nd toe, with open areas.  SKIN: Upper chest, abdomen, and arm rash with erythema an pruritis  NEUROLOGICAL: The patient is oriented to person and place. Strength and sensation are grossly intact. Face is symmetric.    LABS:    Lab Results   Component Value Date    WBC 7.0 07/05/2018    HGB 12.6 (L) 08/09/2018    HCT 37.3 (L) 07/05/2018    MCV 91 07/05/2018     07/05/2018     Results for orders placed or performed in  visit on 07/05/18   Basic Metabolic Panel   Result Value Ref Range    Sodium 139 136 - 145 mmol/L    Potassium 4.5 3.5 - 5.0 mmol/L    Chloride 106 98 - 107 mmol/L    CO2 27 22 - 31 mmol/L    Anion Gap, Calculation 6 5 - 18 mmol/L    Glucose 147 (H) 70 - 125 mg/dL    Calcium 9.0 8.5 - 10.5 mg/dL    BUN 30 (H) 8 - 28 mg/dL    Creatinine 1.19 0.70 - 1.30 mg/dL    GFR MDRD Af Amer >60 >60 mL/min/1.73m2    GFR MDRD Non Af Amer 57 (L) >60 mL/min/1.73m2     Lab Results   Component Value Date    HGBA1C 13.8 (H) 12/20/2018       ASSESSMENT/PLAN:    1. Dermatitis - Will start Clobetasol 0.05% two times a day and change to prn when healed.  Continue Vistaril 10 mg daily   2. Multiple open wounds of foot - Improving, continue current treatment plan.   3. DM (diabetes mellitus) type II uncontrolled, periph vascular disorder (H) - Last A1c 13.8, will continue Novolog and Lantus, and repeat A1c   4. Essential hypertension  - Blood pressures are within target range, will continue Losartan, Aldactone, and Lasix   5. Peripheral vascular disease (H) with chronic left foot wounds - Stable                         CARE PLAN: The care plan has been reviewed and discussed with patient and nursing staff. No changes made at this time except those noted above.  We will continue to monitor.        Electronically signed by:  Barbara Bolaños CNP

## 2021-05-27 NOTE — PROGRESS NOTES
[facility]     NAME: Neel Navarro  : 10/12/1920                                                          Code Status:  DNR    Facility:  [facility]  [[id number]]                Chief Complaint   Patient presents with     Review Of Multiple Medical Conditions         History of Present Illness: eNel is a 89 y.o. male residing at [facility].  He does have:  - Alzheimer's dementia   -Dysphagia : not complying with a thickened liquids as ordered by speech therapy.  He has had episodes of choking on pills.,   - CAD  -PVD with chronic wounds: has been followed.vascular clinic for.  Wounds are being dressed and observed by nursing staff.  - Essential HTN  -Osteoarthritis, primary: bilateral TKAs  -Prostatic hypertrophy with urinary retention  -Diabetes type 2 on insulin    UPDATE:  His HbA1c went from  13.8 in 2018  To 6.2  With the addition of Lantus   mealtime Novolog, sliding scale NOvolog.            Patient Active Problem List   Diagnosis     Angina pectoris (H)     Carotid artery narrowing     Benign neoplasm of colon     Late onset Alzheimer's disease without behavioral disturbance     Enlarged prostate     Gout     Leg weakness     Neuropathy (H)     Primary osteoarthritis involving multiple joints     OP (osteoporosis)     Peripheral blood vessel disorder (H)     Vitamin D deficiency     Essential hypertension     Closed nondisplaced fracture of acetabulum with routine healing     Fall     Allergic dermatitis     Dermatitis     Urinary retention     Gastrointestinal bleeding, upper     Wheezing     Other dysphagia         Allergies   Allergen Reactions     Ranitidine Unknown       Current Medications:  Current Outpatient Medications   Medication Sig     acetaminophen (TYLENOL) 500 MG tablet Take 1,000 mg by mouth every 6 (six) hours as needed for pain Not to exceed 3 grams in 24 hours.            albuterol (PROVENTIL) 2.5 mg /3 mL (0.083 %) nebulizer solution Take 2.5 mg by nebulization every 4 (four) hours as needed for wheezing.      allopurinol (ZYLOPRIM) 100 MG tablet Take 100 mg by mouth daily.     aluminum-magnesium hydroxide-simethicone (MAALOX ADVANCED) 200-200-20 mg/5 mL Susp Take 30 mL by mouth every 2 (two) hours as needed.     ascorbic acid (VITAMIN C) 500 MG tablet Take 500 mg by mouth 2 (two) times a day.      bisacodyl (DULCOLAX) 10 mg suppository Insert 10 mg into the rectum daily as needed (constipation).      calamine lotion Apply 1 application topically 3 (three) times a day as needed. Apply to affected areas  Topically as needed for itching after the TMC cream is used      camphor-menthol (SARNA) lotion Apply 1 application topically as needed for itching. Apply to abdomen and chest daily and PRN     carbamide peroxide (DEBROX) 6.5 % otic solution Administer 3 drops into both ears see administration instructions. Instill 3 drops in both ears as needed for ear wax build up twice daily X 3 days. Irrigate using 30 mL syringe and warm water.     cetirizine (ZYRTEC) 10 MG tablet Take 10 mg by mouth daily.     clobetasol (TEMOVATE) 0.05 % cream Apply 1 application topically 2 (two) times a day as needed.     ferrous sulfate 65 mg elemental iron Take 1 tablet by mouth 2 (two) times a day with meals. 325mg daily           fluticasone (FLONASE) 50 mcg/actuation nasal spray 1 spray into each nostril daily.      furosemide (LASIX) 20 MG tablet Take 40 mg by mouth daily.      guaiFENesin (ROBITUSSIN) 100 mg/5 mL syrup Take 200 mg by mouth 4 (four) times a day.     hydrOXYzine (ATARAX) 25 MG tablet Take 25 mg by mouth at bedtime.     insulin aspart U-100 (NOVOLOG) 100 unit/mL injection Inject under the skin 3 (three) times a day before meals 200-299= 4 units  300-399= 6 units  400-499= 10 units  500-999= 12 units.           insulin aspart U-100 (NOVOLOG) 100 unit/mL injection Inject 10 Units under  the skin 3 (three) times a day before meals.     insulin glargine (BASAGLAR KWIKPEN) 100 unit/mL (3 mL) pen Inject 12 Units under the skin at bedtime.     ipratropium-albuterol (DUO-NEB) 0.5-2.5 mg/3 mL nebulizer Inhale 3 mL 2 (two) times a day.      magnesium hydroxide (MAGNESIUM HYDROXIDE) 400 mg/5 mL Susp suspension Take 30 mL by mouth 2 (two) times a day as needed.      meloxicam (MOBIC) 7.5 MG tablet Take 1 tablet (7.5 mg total) by mouth daily.     omeprazole (PRILOSEC) 20 MG capsule Take 20 mg by mouth daily.     ondansetron (ZOFRAN) 4 MG tablet Take 4 mg by mouth every 6 (six) hours as needed for nausea.     protein supplement Pack Take 1 packet by mouth 2 (two) times a day.     senna-docusate (PERICOLACE) 8.6-50 mg tablet Take 1 tablet by mouth 2 (two) times a day. Do not administer if loose stools (Patient taking differently: Take 1 tablet by mouth daily. Do not administer if loose stools      )     spironolactone (ALDACTONE) 25 MG tablet Take 25 mg by mouth daily.           CRISTOPHER  Has a dry tickly cough, rare white phlegm  Nurse on duty today feels the charcoal is macerating his feet; she would like a dry gauze wrapping by day, and leave uncovered overnight in bed               Social History Narrative     He is from [place] and is a retired .  He was  in 1962 and has one son alive, one daughter  of breast cancer. His niece is his main caretaker.          Blood pressure 119/58, pulse 79, temperature 97.8  F (36.6  C), resp. rate 22, weight (!) 234 lb 9.6 oz (106.4 kg), SpO2 98 %.            Exam  General Appearance: Pleasant, tells jokes, ambulates himself in a wheelchair  Very moist vocal quality  Lungs: Soft inspiratory crackles bilaterally, occasional dry cough  Abdomen: Soft, + BS and non tender to palpation  Musculoskeletal :Wound just redressed, nurse described, protective boot on left foot  Neuro: Moves all extremities and has facial symmetry, uses his legs to  mobilize himself in a wheelchair  Skin: linear scratches over  both forearms  : clear urine in [place]  Mood: Pleasant  Glucose = 136 yesterday before supper     Labs:     Ref Range & Units 3/25/19    Hemoglobin A1c 4.2 - 6.1 % 6.2 Abnormally high          Ref Range & Units 12/20/18    Hemoglobin A1c: ordered by NP 4.2 - 6.1 % 13.8            Ref Range & Units 8/9/18    Hemoglobin 14.0 - 18.0 g/dL 12.6 Abnormally low                Lab Results   Component Value Date      07/05/2018     K 4.5 07/05/2018      07/05/2018     CO2 27 07/05/2018     BUN 30 (H) 07/05/2018     CREATININE 1.19 07/05/2018     CALCIUM 9.0 07/05/2018               Lab Results   Component Value Date     TSH 4.40 04/12/2018      ASSESSMENT / PLAN:      ICD-10-CM    1. DM (diabetes mellitus) type II uncontrolled, periph vascular disorder (H) E11.51  good control of diabetes, should aid in wound healing of his foot    E11.65    2. PVD (peripheral vascular disease) (H) I73.9  can try off the charcoal, last I heard he was being followed by the wound clinic will see if they agree   3. Essential hypertension I10  control   4. Neuropathy (H) G62.9  feet are numb   5. Primary osteoarthritis involving multiple joints M15.0  on Mobic     On allopurinol for history of gout         Electronically signed by:   Melina Mackenzie MD

## 2021-05-28 NOTE — TELEPHONE ENCOUNTER
[facility] Nurse Triage Telephone Note      Provider: KAREEN Bess  Facility: [facility]    Facility Type: LTC    Caller: [facility]   Call Back Number:  [phone]    Allergies: Ranitidine    Reason for call: Pt has been having loose watery stools with some formed soft stools and currently getting senna-s 1 tab 3x weekly and would like to d/c the medication  Also, the pt is not walking as much and usually only walking with therapy  And when the pt would walk with the staff on a daily basis the pt would c/o of back pain and meloxicam was ordered, wondering if they could change the meloxicam to prn since no c/o pain at this time   Verbal Order/Direction given by Provider: change senna s to 1 tab daily prn and meloxicam to 7.5mg daily prn, monitor for loose stools and pain and update if any change      Provider giving order: KAREEN Bess    Verbal order given to: Jessica Alvarez RN

## 2021-05-28 NOTE — TELEPHONE ENCOUNTER
[facility] Nurse Triage Telephone Note      Provider: Melina Mackenzie MD  Facility: [facility]    Facility Type: LTC    Caller: Shaina Noble  Call Back Number:  [phone]    Allergies: Ranitidine    Reason for call: Nurse calling to report Heme 1 and BMP results.  Notable meds:  Lasix 40mg daily, Spironolactone 25mg daily.  Patient is currently eating dinner.       Verbal Order/Direction given by Provider: Potassium 20meq two times a day x 3 days.  Hold Lasix and Spironolactone x 4 days.  Patient needs to drink 1.2L of fluid per day.  Check BMP 5/6/19.      Provider giving order: Melina Mackenzie MD    Verbal order given to: Shaina Francis RN

## 2021-05-28 NOTE — TELEPHONE ENCOUNTER
[facility] Nurse Triage Telephone Note      Provider: Melina Mackenzie MD  Fac[facility]    Facility Type: LTC    Caller: Avani  Call Back Number:  [phone]    Allergies: Ranitidine    Reason for call: Pt has had 3 loose watery stools overnight and 1 loose stool today. Has had decreased appetite and not drinking much has a low garde fever of 99.6, all other VS stable. Recently d/cd his senna s. Not been on any Abx in the last month     Verbal Order/Direction given by Provider: ok for stat CBC, BMP    Provider giving order: Melina Mackenzie MD    Verbal order given to: Shaina Alvarez, SHERICE

## 2021-05-28 NOTE — TELEPHONE ENCOUNTER
[facility] Nurse Triage Telephone Note      Provider: KAREEN Bess  Fac[facility]    Facility Type: LTC    Caller: Shaina  Call Back Number:  [phone]    Allergies: Ranitidine    Reason for call: Mag 1.7, K 4.4, CO2 18, BUN 39 (42), Creat 1.76 (1.86), GFR 36 (34)     Verbal Order/Direction given by Provider: Mag Oxide 400mg daily. Recheck Mag level 5/30.    Provider giving order: KAREEN Bess    Verbal order given to: Shaina Slater RN

## 2021-05-28 NOTE — TELEPHONE ENCOUNTER
[facility] Nurse Triage Telephone Note      Provider: Melina Mackenzie MD  [facility]    Facility Type: LTC    Caller: Shannon  Call Back Number:  [phone]    Allergies: Ranitidine    Reason for call: Nurse calling to report BMP results.  Patient received potassium 20meq two times a day x 3 days, with last dose being this morning.  Patient had 5 loose stools on Friday, 4 loose stools on Saturday, 6 loose stools on Sunday, and 2 loose stools so far today.  Patient is drinking well, close to the ordered 1.2L/day.  VSS.       Verbal Order/Direction given by Provider: Potassium 40meq Q AM and 20meq Q PM x 7 days.  Check stool for C-diff.  Check BMP on 5/13/19.      Provider giving order: Melina Mackenzie MD    Verbal order given to: Shannon Francis, RN

## 2021-05-28 NOTE — PROGRESS NOTES
[facility]     NAME: Neel Navarro  : 10/12/1920                                                          Code Status:  DNR    Facility:  [facility]  [[id number]]                Chief Complaint   Patient presents with     Review Of Multiple Medical Conditions         History of Present Illness: Neel is a 89 y.o. male residing at [facility].  He does have:  - Alzheimer's dementia   -Dysphagia : not complying with a thickened liquids as ordered by speech therapy.  He has had episodes of choking on pills.,   - CAD  -PVD with chronic wounds: has been followed.vascular clinic for.  Wounds are being dressed and observed by nursing staff.  - Essential HTN  -Osteoarthritis, primary: bilateral TKAs  -Prostatic hypertrophy with urinary retention  -Diabetes type 2 on insulin    UPDATE:  He has been having watery diarrhea since .  We checked C. difficile, it was negative.WBC = 11.2 his potassium was low, CO2 was low, BUN and creatinine have gone up a bit consistent with dehydration  We instructed them earlier in the week to be sure he had 1.2 L of fluid a day, as well as started him on potassium replacement.  Nurses report he is been having hyperactive bowel sounds ( not per my exam today below), appetite had been variable, abdomen distended and firm.      His HbA1c went from  13.8 in 2018  To 6.2  With the addition of Lantus, mealtime Novolog, sliding scale Novolog.            Patient Active Problem List   Diagnosis     Angina pectoris (H)     Carotid artery narrowing     Benign neoplasm of colon     Late onset Alzheimer's disease without behavioral disturbance     Enlarged prostate     Gout     Leg weakness     Neuropathy (H)     Primary osteoarthritis involving multiple joints     OP (osteoporosis)     Peripheral blood vessel disorder (H)     Vitamin D deficiency     Essential hypertension      Closed nondisplaced fracture of acetabulum with routine healing     Fall     Allergic dermatitis     Dermatitis     Urinary retention     Gastrointestinal bleeding, upper     Wheezing     Other dysphagia         Allergies   Allergen Reactions     Ranitidine Unknown       Current Medications:  Current Outpatient Medications   Medication Sig     acetaminophen (TYLENOL) 500 MG tablet Take 1,000 mg by mouth every 6 (six) hours as needed for pain Not to exceed 3 grams in 24 hours.           albuterol (PROVENTIL) 2.5 mg /3 mL (0.083 %) nebulizer solution Take 2.5 mg by nebulization every 4 (four) hours as needed for wheezing.      allopurinol (ZYLOPRIM) 100 MG tablet Take 100 mg by mouth daily.     aluminum-magnesium hydroxide-simethicone (MAALOX ADVANCED) 200-200-20 mg/5 mL Susp Take 30 mL by mouth every 2 (two) hours as needed.     ascorbic acid (VITAMIN C) 500 MG tablet Take 500 mg by mouth 2 (two) times a day.      bisacodyl (DULCOLAX) 10 mg suppository Insert 10 mg into the rectum daily as needed (constipation).      calamine lotion Apply 1 application topically 3 (three) times a day as needed. Apply to affected areas  Topically as needed for itching after the TMC cream is used      camphor-menthol (SARNA) lotion Apply 1 application topically as needed for itching. Apply to abdomen and chest daily and PRN     carbamide peroxide (DEBROX) 6.5 % otic solution Administer 3 drops into both ears see administration instructions. Instill 3 drops in both ears as needed for ear wax build up twice daily X 3 days. Irrigate using 30 mL syringe and warm water.     cetirizine (ZYRTEC) 10 MG tablet Take 10 mg by mouth daily.     clobetasol (TEMOVATE) 0.05 % cream Apply 1 application topically 2 (two) times a day as needed.     ferrous sulfate 65 mg elemental iron Take 1 tablet by mouth 2 (two) times a day with meals. 325mg daily           fluticasone (FLONASE) 50 mcg/actuation nasal spray 1 spray into each nostril daily.       furosemide (LASIX) 20 MG tablet Take 40 mg by mouth daily.      guaiFENesin (ROBITUSSIN) 100 mg/5 mL syrup Take 200 mg by mouth 4 (four) times a day.     hydrOXYzine (ATARAX) 25 MG tablet Take 25 mg by mouth at bedtime.     insulin aspart U-100 (NOVOLOG) 100 unit/mL injection Inject under the skin 3 (three) times a day before meals 200-299= 4 units  300-399= 6 units  400-499= 10 units  500-999= 12 units.           insulin aspart U-100 (NOVOLOG) 100 unit/mL injection Inject 10 Units under the skin 3 (three) times a day before meals.     insulin glargine (BASAGLAR KWIKPEN) 100 unit/mL (3 mL) pen Inject 12 Units under the skin at bedtime.     ipratropium-albuterol (DUO-NEB) 0.5-2.5 mg/3 mL nebulizer Inhale 3 mL 2 (two) times a day.      magnesium hydroxide (MAGNESIUM HYDROXIDE) 400 mg/5 mL Susp suspension Take 30 mL by mouth 2 (two) times a day as needed.      meloxicam (MOBIC) 7.5 MG tablet Take 1 tablet (7.5 mg total) by mouth daily. (Patient taking differently: Take 7.5 mg by mouth daily as needed.       )     omeprazole (PRILOSEC) 20 MG capsule Take 20 mg by mouth daily.     ondansetron (ZOFRAN) 4 MG tablet Take 4 mg by mouth every 6 (six) hours as needed for nausea.     potassium chloride (K-DUR,KLOR-CON) 20 MEQ tablet Take by mouth. (40meq Q AM and 20meq Q PM x 7 days)     protein supplement Pack Take 1 packet by mouth 2 (two) times a day.     senna-docusate (PERICOLACE) 8.6-50 mg tablet Take 1 tablet by mouth 2 (two) times a day. Do not administer if loose stools (Patient taking differently: Take 1 tablet by mouth daily as needed. Do not administer if loose stools      )     spironolactone (ALDACTONE) 25 MG tablet Take 25 mg by mouth daily.           ROS  For me he denied being aware of weight loss, denied being aware of diarrhea.  The nurse on duty thought he might be joking around with me            Social History Narrative     He is from [place] [employer] and is a retired .  He was  in   and has one son alive, one daughter  of breast cancer. His niece is his main caretaker.          Blood pressure 121/66, pulse 77, temperature 98.3  F (36.8  C), resp. rate 19, weight (!) 225 lb 3.2 oz (102.2 kg), SpO2 95 %.    Weight down about 9# since the beginning of April        Exam  General Appearance: Pleasant, tells jokes, the PT department slowly using a bike apparatus v  Lungs: Soft inspiratory crackles bilaterally, occasional dry cough  Abdomen: Firm, few bowel sounds , some are a bit hollow in quality, non tender to deep palpation  Musculoskeletal :Wound just redressed, nurse described, protective boot on left foot  Neuro: Moves all extremities and has facial symmetry, uses his legs to mobilize himself in a wheelchair  Skin: linear scratches over  both forearms  : clear urine in [place]  Mood: Pleasant  Glucose = 146 yesterday before supper     Labs:     Ref Range & Units 3/25/19    Hemoglobin A1c 4.2 - 6.1 % 6.2 Abnormally high          Ref Range & Units 18    Hemoglobin A1c: ordered by NP 4.2 - 6.1 % 13.8            Ref Range & Units 18    Hemoglobin 14.0 - 18.0 g/dL 12.6 Abnormally low                Lab Results   Component Value Date      2018     K 4.5 2018      2018     CO2 27 2018     BUN 30 (H) 2018     CREATININE 1.19 2018     CALCIUM 9.0 2018               Lab Results   Component Value Date     TSH 4.40 2018      ASSESSMENT / PLAN:    ICD-10-CM    1. Diarrhea, unspecified type R19.7  we will get an abdominal film flat and upright.  He may well have partial small bowel obstruction with numerous electrolyte abnormalities.  His potassium is low even on replacement, so we will increase him from 20 mEq twice a day up to 40 mEq in the morning and 20 in the evening.  A week from today recheck along with magnesium, sometimes of her magnesium levels low we can replete the potassium.  Earlier in the week had given orders to be  sure that he drinks at least 1.2 L a day.   2. Recent weight loss R63.4    3. DM (diabetes mellitus) type II uncontrolled, periph vascular disorder (H) E11.51  improved role    E11.65    4. Peripheral blood vessel disorder (H) I73.9  still getting dressing changes to his wounds on his feet   5. Essential hypertension I10  pressure in good control   6. Neuropathy (H) G62.9        Electronically signed by:   Melina Mackenzie MD

## 2021-05-30 VITALS — BODY MASS INDEX: 24.34 KG/M2 | WEIGHT: 200 LBS

## 2021-05-30 VITALS — BODY MASS INDEX: 25.85 KG/M2 | WEIGHT: 212.4 LBS

## 2021-05-30 VITALS — BODY MASS INDEX: 24.54 KG/M2 | WEIGHT: 201.6 LBS

## 2021-05-30 VITALS — WEIGHT: 207.4 LBS | BODY MASS INDEX: 25.25 KG/M2

## 2021-05-31 VITALS — BODY MASS INDEX: 27.17 KG/M2 | WEIGHT: 223.2 LBS

## 2021-05-31 VITALS — BODY MASS INDEX: 30.53 KG/M2 | WEIGHT: 250.8 LBS

## 2021-05-31 VITALS — WEIGHT: 235.2 LBS | BODY MASS INDEX: 28.63 KG/M2

## 2021-05-31 VITALS — BODY MASS INDEX: 29.58 KG/M2 | WEIGHT: 243 LBS

## 2021-05-31 VITALS — BODY MASS INDEX: 30.11 KG/M2 | WEIGHT: 247.4 LBS

## 2021-05-31 VITALS — BODY MASS INDEX: 30.31 KG/M2 | WEIGHT: 249 LBS

## 2021-05-31 VITALS — BODY MASS INDEX: 28.36 KG/M2 | WEIGHT: 233 LBS

## 2021-06-01 VITALS — WEIGHT: 248.4 LBS | BODY MASS INDEX: 30.24 KG/M2

## 2021-06-01 VITALS — WEIGHT: 252 LBS | BODY MASS INDEX: 30.67 KG/M2

## 2021-06-01 VITALS — WEIGHT: 248.8 LBS | BODY MASS INDEX: 30.28 KG/M2

## 2021-06-01 VITALS — WEIGHT: 247.8 LBS | BODY MASS INDEX: 30.16 KG/M2

## 2021-06-01 VITALS — WEIGHT: 248 LBS | BODY MASS INDEX: 30.19 KG/M2

## 2021-06-01 VITALS — WEIGHT: 253.8 LBS | BODY MASS INDEX: 30.89 KG/M2

## 2021-06-01 VITALS — BODY MASS INDEX: 30.8 KG/M2 | WEIGHT: 253 LBS

## 2021-06-02 VITALS — BODY MASS INDEX: 28.7 KG/M2 | WEIGHT: 235.8 LBS

## 2021-06-02 VITALS — BODY MASS INDEX: 29.21 KG/M2 | WEIGHT: 240 LBS

## 2021-06-02 VITALS — HEIGHT: 76 IN | WEIGHT: 242 LBS | BODY MASS INDEX: 29.47 KG/M2

## 2021-06-02 VITALS — BODY MASS INDEX: 28.56 KG/M2 | WEIGHT: 234.6 LBS

## 2021-06-02 VITALS — WEIGHT: 234.8 LBS | BODY MASS INDEX: 28.58 KG/M2

## 2021-06-02 VITALS — WEIGHT: 225.2 LBS | BODY MASS INDEX: 27.41 KG/M2

## 2021-06-02 VITALS — BODY MASS INDEX: 28.51 KG/M2 | WEIGHT: 234.2 LBS

## 2021-06-02 VITALS — BODY MASS INDEX: 30.43 KG/M2 | WEIGHT: 250 LBS

## 2021-06-02 VITALS — WEIGHT: 254.4 LBS | BODY MASS INDEX: 30.97 KG/M2

## 2021-06-08 NOTE — PROGRESS NOTES
[facility]    Facility:   [facility] [[id number]]   Code Status: DNR/DNI      CHIEF COMPLAINT/REASON FOR VISIT:  Chief Complaint   Patient presents with     Review Of Multiple Medical Conditions       HISTORY:      HPI: Neel is a 89 y.o. male  who sustained a pelvic fracture after a fall. It was decided by family to not undergo surgery and he was discharged From [facility] on Dec 5th  back to [facility] with hospice. Overall he is doing remarkably well. His pain is minimal and I did decrease his oxycodone  from 5mg BID to 5 mg daily. It appears if he continues to do well he will come off hospice care in March.  He does also have progressive dementia with advanced late onset Alzheimers.  His only report was pain in his right knee which was stiff. He does have a HX of bilateral total knee arthroplasty. I will have PT increase ROM as tolerated to knee when working with him.     Past Medical History   Diagnosis Date     CAD (coronary artery disease)      Dementia      Enlarged prostate      Essential hypertension      Essential hypertension      HLD (hyperlipidemia)      Late onset Alzheimer's disease without behavioral disturbance 6/10/2010             Family History   Problem Relation Age of Onset     No Medical Problems Mother      Heart attack Father      Heart attack Brother      Social History     Social History     Marital status:      Spouse name: N/A     Number of children: N/A     Years of education: N/A     Occupational History           retired     Social History Main Topics     Smoking status: Never Smoker     Smokeless tobacco: Never Used     Alcohol use No     Drug use: No     Sexual activity: No     Other Topics Concern     Not on file     Social History Narrative    Formerly in [place] at [facility].  Niece is caretaker He has one son in area  6/2016         Review of Systems   Constitutional: Positive for appetite  change. Negative for chills and fever.        Reported he does not like the food   HENT: Negative for congestion and sore throat.    Respiratory: Negative for cough and shortness of breath.    Cardiovascular: Positive for leg swelling. Negative for chest pain.   Gastrointestinal: Negative for abdominal distention, abdominal pain, constipation and diarrhea.   Genitourinary: Positive for dysuria.   Musculoskeletal: Positive for arthralgias.   Neurological: Negative for dizziness.   Psychiatric/Behavioral: Negative for sleep disturbance.       .  Vitals:    01/25/17 1951   BP: 97/56   Pulse: 79   Resp: 18   Temp: 98.5  F (36.9  C)   SpO2: 96%   Weight: 200 lb (90.7 kg)       Physical Exam   Constitutional: He appears well-nourished.   HENT:   Head: Normocephalic.   Eyes: Conjunctivae are normal.   Cardiovascular: Normal rate, regular rhythm and normal heart sounds.    No murmur heard.  Pulmonary/Chest: Effort normal and breath sounds normal. He has no wheezes. He has no rales.   Abdominal: Soft. Bowel sounds are normal. He exhibits no distension. There is no tenderness.   Genitourinary:   Genitourinary Comments: Flower with clear yellow urine.   Musculoskeletal: He exhibits edema.   1+bilateral t lower ext   Neurological: He is alert.   Skin: Skin is warm and dry.   Psychiatric: He has a normal mood and affect. His behavior is normal.         LABS:   Reviewed.    ASSESSMENT:      ICD-10-CM    1. Closed nondisplaced fracture of acetabulum with routine healing S32.409D    2. Enlarged prostate N40.0    3. Neuropathy G62.9    4. Late onset Alzheimer's disease without behavioral disturbance G30.1     F02.80    5. Vitamin D deficiency E55.9        PLAN:    The plan is is to decrease oxycodone to 5mg daily and QID PRN, Physical therapy for ROM right knee as tolerated. [facility] and folow up x-ray Otherwise resident is doing well and has no other concerns.          Electronically signed by: Mckenna Douglass CNP

## 2021-06-08 NOTE — PROGRESS NOTES
[facility]    DATE: 2017    NAME: Neel Navarro  : 10/12/1920           MR# [id number]     CODE STATUS:  DNR/DNI      VISIT TYPE: Problem   FACILITY: [facility] [[id number]]    ROOM: 323    PRIMARY CARE PROVIDER: Amanda Sheehan CNP Phone: [phone] Fax:[phone]    History of Present Illness:   Neel Navarro is a 89 y.o. male with Recent acetabular Fracture now stabilized clinically. He has underlying dementia but would like to try to get back to walking around and moving again as he had before the fall in the fracture. I have recommended in orthopedics has confirmed rehabilitation to his tolerance in an effort to reestablish his mobility. He has no questions today but does note that his right leg is really stiff and hard to move    Past Medical History:  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 behavioral disturbance 6/10/2010       Allergies:  No Known Allergies    Current Medications:  Current Outpatient Prescriptions   Medication Sig     acetaminophen (TYLENOL) 325 MG tablet Take 650 mg by mouth every 4 (four) hours as needed for pain.     ascorbic acid (VITAMIN C) 500 MG tablet Take 500 mg by mouth daily.     bisacodyl (DULCOLAX) 10 mg suppository Insert 10 mg into the rectum as needed (constipation). Indications: Constipation     dextromethorphan-guaiFENesin (ROBITUSSIN-DM)  mg/5 mL liquid Take 5 mL by mouth 2 (two) times a day as needed (cough).     ferrous sulfate 65 mg elemental iron Take 1 tablet by mouth daily with breakfast.     fluticasone (FLONASE) 50 mcg/actuation nasal spray 2 sprays into each nostril daily. Indications: Allergic Rhinitis     loperamide (IMODIUM A-D) 2 mg tablet Take 2 mg by mouth 4 (four) times a day as needed for  diarrhea. Up to 8 doses in (16mg) in 24 hours.     magnesium hydroxide (MAGNESIUM HYDROXIDE) 400 mg/5 mL Susp suspension Take 30 mL by mouth daily as needed.     metoprolol succinate (TOPROL-XL) 25 MG Take 25 mg by mouth daily.     omeprazole (PRILOSEC) 20 MG capsule Take 20 mg by mouth daily. Indications: Heartburn     oxyCODONE (ROXICODONE) 5 MG immediate release tablet Take 5 mg by mouth daily. And 5 mg QID PRN     senna-docusate (PERICOLACE) 8.6-50 mg tablet Take 1 tablet by mouth 2 (two) times a day. Do not administer if loose stools     sodium chloride 3 % nebulizer solution Take 3 mL by nebulization as needed.     terazosin (HYTRIN) 2 MG capsule Take 2 mg by mouth bedtime. Indications: SYMPTOMATIC BENIGN PROSTATIC HYPERPLASIA     whey protein isolate (BENEPROTEIN) 6 gram-25 kcal/7 gram PwPk Take 1 packet by mouth 2 (two) times a day.       Review of Systems:  History obtained from chart review and the patient  Respiratory ROS: no cough, shortness of breath, or wheezing  Cardiovascular ROS: no chest pain or dyspnea on exertion  Gastrointestinal ROS: no abdominal pain, change in bowel habits, or black or bloody stools  Genito-Urinary ROS: no dysuria, trouble voiding, or hematuria  Musculoskeletal ROS: Stiff right leg with weakness more than pain on motion by his reports  Neurological ROS: no TIA or stroke symptoms  Dermatological ROS: no open skin lesions are acknowledged         Laboratory:  No results for input(s): INR in the last 72 hours.       Physical Examination:  Visit Vitals     /69     Pulse 98     Temp 97.3  F (36.3  C)     Resp 18     Wt 201 lb 9.6 oz (91.4 kg)     SpO2 93%     BMI 24.54 kg/m2     General appearance: alert, appears stated age, cooperative, fatigued, no distress and slowed mentation  Neck: no adenopathy, no carotid bruit, no JVD, supple, symmetrical, trachea midline and thyroid not enlarged, symmetric, no tenderness/mass/nodules  Lungs: clear to auscultation bilaterally  Heart:  regular rate and rhythm, S1, S2 normal, no murmur, click, rub or gallop  Abdomen: soft, non-tender; bowel sounds normal; no masses,  no organomegaly  Extremities: mild swelling of the right  lower extremity with stiffness at the hip but only minimal soreness rather than pain on motion  Pulses: 2+ and symmetric  Neurologic: Mental status: Alert, oriented, thought content appropriate, although somewhat slowed in his responses  Motor:marked asymmetry of strength on the right leg compared the left but upper extremity symmetrical       Impression:  Neel Navarro is a 89 y.o. male with With a clinically improved acetabular fracture    1. Closed nondisplaced fracture of acetabulum with routine healing     2. Late onset Alzheimer's disease without behavioral disturbance     3. Primary osteoarthritis involving multiple joints     4. Essential hypertension with goal blood pressure less than 140/90     5. OP (osteoporosis)     6. Combined fat and carbohydrate induced hyperlipemia     7. Fall, subsequent encounter         Plan: Encourage exercise to tolerance to see if we can maximize recovery. He is off hospice at this point and it's conceivable he would be, ambulatory again.    Electronically signed by: Jose Howard Sr., MD

## 2021-06-09 NOTE — PROGRESS NOTES
[facility]    Facility:   [facility] [[id number]]   Code Status: DNR/DNI      CHIEF COMPLAINT/REASON FOR VISIT:  Chief Complaint   Patient presents with     Review Of Multiple Medical Conditions       HISTORY:      HPI: Neel is a 89 y.o. male  who sustained a pelvic fracture after a fall. It was decided by family to not undergo surgery and he was discharged From [facility] on Dec 5th  back to [facility] with hospice. Overall he is doing remarkably well. His pain is minimal and he no longer requires the use of oxycodone, He was discharged from hospice care.  He does also have progressive dementia with advanced late onset Alzheimers.  He continues to work with therapy as tolerated. He denies CP or SOB. Last documented HGB low, he denied dizziness but later reported sometime he gets dizzy for a few seconds with use of the pako lift.    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 behavioral disturbance 6/10/2010             Family History   Problem Relation Age of Onset     No Medical Problems Mother      Heart attack Father      Heart attack Brother      Social History     Social History     Marital status:      Spouse name: N/A     Number of children: N/A     Years of education: N/A     Occupational History           retired     Social History Main Topics     Smoking status: Never Smoker     Smokeless tobacco: Never Used     Alcohol use No     Drug use: No     Sexual activity: No     Other Topics Concern     Not on file     Social History Narrative    Formerly in [place] at [facility].  Niece is caretaker He has one son in area  6/2016         Review of Systems   Constitutional: Negative for appetite change, chills and fever.   HENT: Negative  for congestion and sore throat.    Respiratory: Negative for cough and shortness of breath.    Cardiovascular: Positive for leg swelling. Negative for chest pain.   Gastrointestinal: Negative for abdominal distention, abdominal pain, constipation and diarrhea.   Genitourinary: Negative for dysuria.        Leg bag   Musculoskeletal: Positive for arthralgias.        Right hip   Neurological: Negative for dizziness.   Psychiatric/Behavioral: Negative for sleep disturbance.       .  Vitals:    03/21/17 1925   BP: 113/58   Pulse: 80   Resp: 19   Temp: 97.2  F (36.2  C)   SpO2: 96%   Weight: 207 lb 6.4 oz (94.1 kg)       Physical Exam   Constitutional: He appears well-nourished.   HENT:   Head: Normocephalic.   Eyes: Conjunctivae are normal.   Cardiovascular: Normal rate, regular rhythm and normal heart sounds.    No murmur heard.  Pulmonary/Chest: Effort normal and breath sounds normal. He has no wheezes. He has no rales.   Abdominal: Soft. Bowel sounds are normal. He exhibits no distension. There is no tenderness.   Genitourinary:   Genitourinary Comments: Flower with clear yellow urine.   Musculoskeletal: He exhibits edema.   1+bilateral t lower ext   Neurological: He is alert.   Skin: Skin is warm and dry.   Psychiatric: He has a normal mood and affect. His behavior is normal.         LABS:   Reviewed.Last HGB 8.2 on 12/5/16    ASSESSMENT:      ICD-10-CM    1. Late onset Alzheimer's disease without behavioral disturbance G30.1     F02.80    2. Essential hypertension with goal blood pressure less than 140/90 I10    3. Leg weakness M62.81    4. Arthralgia of shoulder M25.519    5. Enlarged prostate N40.0    6. Closed nondisplaced fracture of acetabulum with routine healing S32.409D        PLAN:    Pt no longer on oxycodone-pain tolerable  with current medications.   Physical therapy currently working with pt.  Otherwise resident is doing well and has no other concerns.   Dizziness- denied at first but then reported  sometimes he feels dizzy for a few seconds when being lifted with the pako- Check HGB.  Lower ext edema- Elevate legs while up in the chair.      Electronically signed by: Mckenna Douglass CNP

## 2021-06-10 NOTE — PROGRESS NOTES
[facility]    Facility:   [facility] [[id number]]   Code Status: DNR/DNI      CHIEF COMPLAINT/REASON FOR VISIT:  Chief Complaint   Patient presents with     Review Of Multiple Medical Conditions       HISTORY:      HPI: Neel is a 89 y.o. male  who sustained a pelvic fracture after a fall. It was decided by family to not undergo surgery and he was discharged From [facility] on Dec 5th  back to [facility] with hospice. Overall he is doing remarkably well. His pain is minimal and he no longer requires the use of oxycodone, He was discharged from hospice care.  He does also have progressive dementia with advanced late onset Alzheimers.  He continues to work with therapy as tolerated and staff tell me is progressing well with restorative care. He denies CP or SOB. Today he tells me he had an isolated episode of diarrhea yesterday and believes it was related to something he ate. Today he tells me he feels well and has no abdominal  Discomfort.  He reports an occasional ache right hip which is relieved with Tylenol.    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 behavioral disturbance 6/10/2010             Family History   Problem Relation Age of Onset     No Medical Problems Mother      Heart attack Father      Heart attack Brother      Social History     Social History     Marital status:      Spouse name: N/A     Number of children: N/A     Years of education: N/A     Occupational History           retired     Social History Main Topics     Smoking status: Never Smoker     Smokeless tobacco: Never Used     Alcohol use No     Drug use: No     Sexual activity: No     Other Topics Concern     Not on file     Social History Narrative     Formerly in [place] at [facility].  Niece is caretaker He has one son in area  6/2016         Review of Systems   Constitutional: Negative for appetite change, chills and fever.   HENT: Negative for congestion and sore throat.    Respiratory: Negative for cough and shortness of breath.    Cardiovascular: Positive for leg swelling. Negative for chest pain.   Gastrointestinal: Negative for abdominal distention, abdominal pain, constipation and diarrhea.   Genitourinary: Negative for dysuria.        Leg bag   Musculoskeletal: Positive for arthralgias.        Right hip   Neurological: Negative for dizziness.   Psychiatric/Behavioral: Negative for sleep disturbance.       .  Vitals:    05/23/17 1710   BP: 131/71   Pulse: 74   Resp: 20   Temp: 97.3  F (36.3  C)   SpO2: 97%   Weight: 212 lb 6.4 oz (96.3 kg)       Physical Exam   Constitutional: He appears well-nourished.   HENT:   Head: Normocephalic.   Eyes: Conjunctivae are normal.   Cardiovascular: Normal rate, regular rhythm and normal heart sounds.    No murmur heard.  Pulmonary/Chest: Effort normal and breath sounds normal. He has no wheezes. He has no rales.   Abdominal: Soft. Bowel sounds are normal. He exhibits no distension. There is no tenderness.   Genitourinary:   Genitourinary Comments: Flower with clear yellow urine.   Musculoskeletal: He exhibits edema.   1+bilateral t lower ext   Neurological: He is alert.   Skin: Skin is warm and dry.   Psychiatric: He has a normal mood and affect. His behavior is normal.         LABS:    Hgb 12.1 on 3/23/17. Up from HGB on 12/5/16 which was  HGB 8.2     ASSESSMENT:      ICD-10-CM    1. Late onset Alzheimer's disease without behavioral disturbance G30.1     F02.80    2. Primary osteoarthritis involving multiple joints M15.0    3. Essential hypertension with goal blood pressure less than 140/90 I10    4. Closed nondisplaced fracture of acetabulum with routine healing S32.409D    5. Neuropathy G62.9        PLAN:    Pt no longer on  oxycodone-pain tolerable  with current medications.   Physical therapy currently working with restorative care  Otherwise resident is doing well and has no other concerns.   Isolated episode of diarrhea- Continue to monitor and report to provider if it continues  Lower ext edema- Elevate legs while up in the chair.      Electronically signed by: Mckenna Douglass CNP

## 2021-06-10 NOTE — PROGRESS NOTES
[facility]    DATE: 2017    NAME: Neel Navarro  : 10/12/1920           MR# [id number]     CODE STATUS:  DNR/DNI      VISIT TYPE: Problem   FACILITY: [facility] [[id number]]    ROOM: 323    PRIMARY CARE PROVIDER: Mckenna Douglass CNP Phone: [phone] Fax:[phone]    History of Present Illness:   Neel Navarro is a 89 y.o. male with A recent acetabular fracture treated non-surgically with unexpected significant clinical improvement. He wishes to visit with the  Orthopedist again and orthopedist wishes to set up an appointment to be assured that he can release them to full activity. His nephew is willing to drive him to the appointment. Visiting with Ray today he is interested in further opinion especially since the doctor wishes to see him. He reports mostly right knee pain rather than hip discomfort.    Past Medical History:  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 behavioral disturbance 6/10/2010       Allergies:  No Known Allergies    Current Medications:  Current Outpatient Prescriptions   Medication Sig     acetaminophen (TYLENOL) 325 MG tablet Take 650 mg by mouth every 4 (four) hours as needed for pain.     ascorbic acid (VITAMIN C) 500 MG tablet Take 500 mg by mouth daily.     bisacodyl (DULCOLAX) 10 mg suppository Insert 10 mg into the rectum as needed (constipation). Indications: Constipation     dextromethorphan-guaiFENesin (ROBITUSSIN-DM)  mg/5 mL liquid Take 5 mL by mouth 2 (two) times a day as needed (cough).     ferrous sulfate 65 mg elemental iron Take 1 tablet by mouth daily with breakfast. 325mg daily     fluticasone (FLONASE) 50 mcg/actuation nasal spray 2 sprays into each nostril daily. Indications: Allergic Rhinitis     magnesium  hydroxide (MAGNESIUM HYDROXIDE) 400 mg/5 mL Susp suspension Take 30 mL by mouth daily as needed.     metoprolol succinate (TOPROL-XL) 25 MG Take 25 mg by mouth daily.     omeprazole (PRILOSEC) 20 MG capsule Take 20 mg by mouth daily. Indications: Heartburn     senna-docusate (PERICOLACE) 8.6-50 mg tablet Take 1 tablet by mouth 2 (two) times a day. Do not administer if loose stools     terazosin (HYTRIN) 2 MG capsule Take 2 mg by mouth bedtime. Indications: SYMPTOMATIC BENIGN PROSTATIC HYPERPLASIA     whey protein isolate (BENEPROTEIN) 6 gram-25 kcal/7 gram PwPk Take 1 packet by mouth 2 (two) times a day.       Review of Systems:  History obtained from chart review and the patient  Respiratory ROS: no cough, shortness of breath, or wheezing  Cardiovascular ROS: no chest pain or dyspnea on exertion  Gastrointestinal ROS: no abdominal pain, change in bowel habits, or black or bloody stools  Genito-Urinary ROS: no dysuria, trouble voiding, or hematuria  Musculoskeletal ROS: He mostly complains of right knee pain but when I pressure them to make sure it's the hipper the knee says maybe just above the knee  Neurological ROS: no TIA or stroke symptoms  Dermatological ROS: skin is clear by my examined nursing service reports         Laboratory:  No results for input(s): INR in the last 72 hours.       Physical Examination:  /56  Pulse 72  Temp 97.4  F (36.3  C)  Resp 18  SpO2 94%  General appearance: alert, appears stated age, cooperative, distracted, fatigued, no distress and slowed mentation  Neck: no adenopathy, no carotid bruit, no JVD, supple, symmetrical, trachea midline and thyroid not enlarged, symmetric, no tenderness/mass/nodules  Lungs: clear to auscultation bilaterally  Heart: regular rate and rhythm, S1, S2 normal, no murmur, click, rub or gallop  Abdomen: soft, non-tender; bowel sounds normal; no masses,  no organomegaly  Extremities: he objects to moving the right knee but  open of the hip actively  and passively do not seem to bother him. Unfortunately I am checking him nonweightbearing  Pulses: 2+ and symmetric  Skin: Skin color, texture, turgor normal. No rashes or lesions  Neurologic: Mental status: Alert, oriented, thought content appropriate  Motor:he has weakness and slight swelling in his right hip area       Impression:  Neel Navarro is a 89 y.o. male with Recent acetabular fracture    1. Closed nondisplaced fracture of acetabulum with routine healing     2. Late onset Alzheimer's disease without behavioral disturbance     3. Primary osteoarthritis involving multiple joints     4. Essential hypertension with goal blood pressure less than 140/90     5. OP (osteoporosis)     6. Combined fat and carbohydrate induced hyperlipemia     7. Fall, sequela         Plan: Since he wishes to visit with orthopedics and they've been asking for a follow-up appointment I encourage nursing service to contact the nephew and make arrangements    Electronically signed by: Jose Howard Sr., MD

## 2021-06-11 NOTE — PROGRESS NOTES
[facility]    DATE: 2017    NAME: Neel Navarro  : 10/12/1920           MR# [id number]     CODE STATUS:  DNR/DNI      VISIT TYPE: Problem   FACILITY: [facility] [[id number]]    ROOM: 323    PRIMARY CARE PROVIDER: Mckenna Douglass CNP Phone: [phone] Fax:[phone]    History of Present Illness:   Neel Navarro is a 89 y.o. male with A clinically healed acetabular fracture that allows him to walk with minimal if any pain in his right hip. He is very upbeat today saying he feels quite comfortable he says he occasionally has some knee pain that will limit him as he's walking around but only for a few instants. He reports he's recovered his appetite and is all function and is generally feeling great    Past Medical History:  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 behavioral disturbance 6/10/2010       Allergies:  No Known Allergies    Current Medications:  Current Outpatient Prescriptions   Medication Sig     acetaminophen (TYLENOL) 500 MG tablet Take 1,000 mg by mouth daily. And 1-2 tablets as needed for pain BID     albuterol (PROVENTIL) 2.5 mg /3 mL (0.083 %) nebulizer solution Take 2.5 mg by nebulization every 4 (four) hours as needed for wheezing.     ascorbic acid (VITAMIN C) 500 MG tablet Take 500 mg by mouth daily.     bisacodyl (DULCOLAX) 10 mg suppository Insert 10 mg into the rectum as needed (constipation). Indications: Constipation     carbamide peroxide (DEBROX) 6.5 % otic solution Administer 3 drops into both ears as needed. BID for 3 days     dextromethorphan-guaiFENesin (ROBITUSSIN-DM)  mg/5 mL liquid Take 5 mL by mouth 2 (two) times a day as needed (cough).     ferrous sulfate 65 mg elemental iron Take 1 tablet by mouth daily with breakfast.  325mg daily     fluticasone (FLONASE) 50 mcg/actuation nasal spray 2 sprays into each nostril daily. Indications: Allergic Rhinitis     magnesium hydroxide (MAGNESIUM HYDROXIDE) 400 mg/5 mL Susp suspension Take 30 mL by mouth daily as needed.     metoprolol succinate (TOPROL-XL) 25 MG Take 25 mg by mouth daily.     ranitidine (ZANTAC) 150 MG tablet Take 150 mg by mouth daily.     senna-docusate (PERICOLACE) 8.6-50 mg tablet Take 1 tablet by mouth 2 (two) times a day. Do not administer if loose stools     terazosin (HYTRIN) 2 MG capsule Take 2 mg by mouth bedtime. Indications: SYMPTOMATIC BENIGN PROSTATIC HYPERPLASIA     whey protein isolate (BENEPROTEIN) 6 gram-25 kcal/7 gram PwPk Take 1 packet by mouth 2 (two) times a day.       Review of Systems:  History obtained from chart review and the patient  Respiratory ROS: no cough, shortness of breath, or wheezing  Cardiovascular ROS: no chest pain or dyspnea on exertion  Gastrointestinal ROS: no abdominal pain, change in bowel habits, or black or bloody stools  Genito-Urinary ROS: Flower catheter in place  Musculoskeletal ROS: His strength is stable he reports he emulates pretty much whenever he wants to as far as the full length of the harris to the cafeteria  Neurological ROS: no TIA or stroke symptoms  Dermatological ROS: he denies any open skin lesions         Laboratory:  No results for input(s): INR in the last 72 hours.  None    Physical Examination:  /67  Pulse 82  Temp 97.2  F (36.2  C)  Resp 20  SpO2 95%  General appearance: alert, appears stated age, cooperative, no distress and mildly obese  Neck: no adenopathy, no carotid bruit, no JVD, supple, symmetrical, trachea midline and thyroid not enlarged, symmetric, no tenderness/mass/nodules  Lungs: clear to auscultation bilaterally  Heart: regular rate and rhythm, S1, S2 normal, no murmur, click, rub or gallop  Abdomen: soft, non-tender; bowel sounds normal; no masses,  no organomegaly  Extremities:  extremities normal, atraumatic, no cyanosis or edema  Pulses: 2+ and symmetric  Skin: Skin color, texture, turgor normal. No rashes or lesions  Neurologic: Mental status: Alert, oriented, thought content appropriate  Motor:mild globally diminished strength no localization       Impression:  Neel Navarro is a 89 y.o. male with Complete clinical recovery from a acetabular fracture    1. Closed nondisplaced fracture of acetabulum with routine healing     2. Late onset Alzheimer's disease without behavioral disturbance     3. Primary osteoarthritis involving multiple joints     4. Essential hypertension with goal blood pressure less than 140/90     5. OP (osteoporosis)     6. Combined fat and carbohydrate induced hyperlipemia     7. Fall, sequela         Plan: Encourage him to regain full function as his hip is no longer bothering    Electronically signed by: Jose Howard Sr., MD

## 2021-06-12 NOTE — PROGRESS NOTES
[facility]    Facility:   [facility] [[id number]]   Code Status: DNR/DNI      CHIEF COMPLAINT/REASON FOR VISIT:  Chief Complaint   Patient presents with     Review Of Multiple Medical Conditions       HISTORY:      HPI: Neel is a 89 y.o. male  who sustained a pelvic fracture after a fall. It was decided by family to not undergo surgery and he was discharged From [facility] on Dec 5th  back to [facility] with hospice.   Overall he is doing remarkably well. His pain is minimal and he no longer requires the use of oxycodone, He was discharged from hospice care.  He does also have progressive dementia with advanced late onset Alzheimers.  He continues to work with therapy as tolerated and staff tell me is progressing well with restorative care. He denies CP or SOB. His ponly concern was that he reported his catheter was leaking. Nursing notified and will assess. He was also noted to have on ripped compression stockings and also ones that were to long for him. His family was notified by nursing that he will need a new pair. Nursing to measure  and will notify family of correct size. It appears the facility no longer provides them.     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 behavioral disturbance 6/10/2010             Family History   Problem Relation Age of Onset     No Medical Problems Mother      Heart attack Father      Heart attack Brother      Social History     Social History     Marital status:      Spouse name: N/A     Number of children: N/A     Years of education: N/A     Occupational History           retired     Social History Main Topics     Smoking status: Never Smoker     Smokeless tobacco: Never Used     Alcohol use  No     Drug use: No     Sexual activity: No     Other Topics Concern     Not on file     Social History Narrative    Formerly in [place] at [facility].  Niece is caretaker He has one son in area  6/2016         Review of Systems   Constitutional: Negative for appetite change, chills and fever.   HENT: Negative for congestion and sore throat.    Respiratory: Negative for cough and shortness of breath.    Cardiovascular: Positive for leg swelling. Negative for chest pain.   Gastrointestinal: Negative for abdominal distention, abdominal pain, constipation and diarrhea.   Genitourinary: Negative for dysuria.        Leg bag   Musculoskeletal: Positive for arthralgias.        Right hip   Neurological: Negative for dizziness.   Psychiatric/Behavioral: Negative for sleep disturbance.       .  Vitals:    07/26/17 0810   BP: 113/58   Pulse: 90   Resp: 19   Temp: 97.6  F (36.4  C)   SpO2: 95%       Physical Exam   Constitutional: He appears well-nourished.   HENT:   Head: Normocephalic.   Eyes: Conjunctivae are normal.   Cardiovascular: Normal rate, regular rhythm and normal heart sounds.    No murmur heard.  Pulmonary/Chest: Effort normal and breath sounds normal. He has no wheezes. He has no rales.   Abdominal: Soft. Bowel sounds are normal. He exhibits no distension. There is no tenderness.   Genitourinary:   Genitourinary Comments: Flower with clear yellow urine.   Musculoskeletal: He exhibits edema.   1+bilateral t lower ext   Neurological: He is alert.   Skin: Skin is warm and dry.   Psychiatric: He has a normal mood and affect. His behavior is normal.         LABS:   No recent labs    ASSESSMENT:      ICD-10-CM    1. Essential hypertension with goal blood pressure less than 140/90 I10    2. Leg weakness M62.81    3. Late onset Alzheimer's disease without behavioral disturbance G30.1     F02.80    4. OP (osteoporosis) M81.0        PLAN:    Pt no longer on oxycodone-pain tolerable  with current medications.   Physical therapy  currently working with restorative care  Otherwise resident is doing well and has no other concerns.   Lower ext edema- Elevate legs while up in the chair. Pt will need a new pair of compression stockings.     Electronically signed by: Mckenna Douglass CNP

## 2021-06-12 NOTE — PROGRESS NOTES
[facility]    DATE: 2017    NAME: Neel Navarro  : 10/12/1920           MR# [id number]     CODE STATUS:  DNR/DNI      VISIT TYPE: Problem   FACILITY: [facility] [[id number]]    ROOM: 323    PRIMARY CARE PROVIDER: Mckenna Douglass CNP Phone: [phone] Fax:[phone]    History of Present Illness:   Neel Navarro is a 89 y.o. male with A history of activity limitation related to acetabular fracture no  Clinically resolved.. He does report occasional knee pain as he peddles his wheelchair and a note he's had some definitely upward  Progress in his cognitive status. He is avidly watching a  Women's basketball game on TV and commenting quite appropriately  As well as remembering that this is a replay  So he knows the outcome of the game.    Past Medical History:  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 behavioral disturbance 6/10/2010       Allergies:  No Known Allergies    Current Medications:  Current Outpatient Prescriptions   Medication Sig     acetaminophen (TYLENOL) 500 MG tablet Take 1,000 mg by mouth daily. And 1-2 tablets as needed for pain BID     albuterol (PROVENTIL) 2.5 mg /3 mL (0.083 %) nebulizer solution Take 2.5 mg by nebulization every 4 (four) hours as needed for wheezing.     ascorbic acid (VITAMIN C) 500 MG tablet Take 500 mg by mouth daily.     bisacodyl (DULCOLAX) 10 mg suppository Insert 10 mg into the rectum as needed (constipation). Indications: Constipation     carbamide peroxide (DEBROX) 6.5 % otic solution Administer 3 drops into both ears as needed. BID for 3 days     dextromethorphan-guaiFENesin (ROBITUSSIN-DM)  mg/5 mL liquid Take 5 mL by mouth 2 (two) times a day as needed (cough).     ferrous sulfate 65 mg elemental iron Take 1  tablet by mouth daily with breakfast. 325mg daily     fluticasone (FLONASE) 50 mcg/actuation nasal spray 2 sprays into each nostril daily. Indications: Allergic Rhinitis     magnesium hydroxide (MAGNESIUM HYDROXIDE) 400 mg/5 mL Susp suspension Take 30 mL by mouth daily as needed.     metoprolol succinate (TOPROL-XL) 25 MG Take 25 mg by mouth daily.     ranitidine (ZANTAC) 150 MG tablet Take 150 mg by mouth daily.     senna-docusate (PERICOLACE) 8.6-50 mg tablet Take 1 tablet by mouth 2 (two) times a day. Do not administer if loose stools     terazosin (HYTRIN) 2 MG capsule Take 2 mg by mouth bedtime. Indications: SYMPTOMATIC BENIGN PROSTATIC HYPERPLASIA     whey protein isolate (BENEPROTEIN) 6 gram-25 kcal/7 gram PwPk Take 1 packet by mouth 2 (two) times a day.       Review of Systems:  History obtained from chart review and the patient  Respiratory ROS: no cough, shortness of breath, or wheezing  Cardiovascular ROS: no chest pain or dyspnea on exertion  Gastrointestinal ROS: no abdominal pain, change in bowel habits, or black or bloody stools  Genito-Urinary ROS: no dysuria, trouble voiding, or hematuria  Musculoskeletal ROS: He has intermittent right knee pain and rare pain in his right hip he prefers to wheelchair for motion but peddles it with his feet  Neurological ROS: no TIA or stroke symptoms  Dermatological ROS: hhe denies active skin lesions         Laboratory:  No results for input(s): INR in the last 72 hours.       Physical Examination:  /71  Pulse 68  Temp 97.2  F (36.2  C)  Resp 18  Wt (!) 223 lb 3.2 oz (101.2 kg)  SpO2 94%  BMI 27.17 kg/m2  General appearance: alert, appears stated age, cachectic, cooperative, distracted and no distress  Neck: no adenopathy, no carotid bruit, no JVD, supple, symmetrical, trachea midline and thyroid not enlarged, symmetric, no tenderness/mass/nodules  Lungs: clear to auscultation bilaterally  Heart: regular rate and rhythm, S1, S2 normal, no murmur, click,  rub or gallop  Abdomen: soft, non-tender; bowel sounds normal; no masses,  no organomegaly  Extremities: some crepitus on motion of the right knee but it is not painful today painless motion of the right hip despite history of acetabular fracture  Pulses: 2+ and symmetric  Skin: Skin color, texture, turgor normal. No rashes or lesions  Neurologic: Grossly normal       Impression:  Neel Navarro is a 89 y.o. male with Healed acetabular fracture    1. Late onset Alzheimer's disease without behavioral disturbance     2. Primary osteoarthritis involving multiple joints     3. Essential hypertension with goal blood pressure less than 140/90     4. OP (osteoporosis)     5. Combined fat and carbohydrate induced hyperlipemia     6. Fall, sequela     7. Closed nondisplaced fracture of acetabulum with routine healing     8. Leg weakness     9. Carotid artery narrowing     10. Enlarged prostate     11. Benign neoplasm of colon, unspecified part of colon         Plan: Courage activity as he's able and continued engagement in his environment as it seems to be helping them cognitively    Electronically signed by: Jose Howard Sr., MD

## 2021-06-13 NOTE — PROGRESS NOTES
[facility]    DATE: 10/11/2017    NAME: Neel Navarro  : 10/12/1920           MR# [id number]     CODE STATUS:  DNR/DNI      VISIT TYPE: [facility]  FACILITY: [facility] [[id number]]    ROOM: 323    PRIMARY CARE PROVIDER: Mckenna Douglass CNP Phone: [phone] Fax:[phone]    History of Present Illness:   Neel Navarro is a 89 y.o. male with A history of acetabular fracture  Which is overcome with patience and increasing physical therapy in our facility. He feels things are generally going well but knows he needs to push harder to regain his full ambulatory ability if it will happen. We discuss the cap on his drainage bag it had leaked on a previous bag leaking urine on To his sock and shoe.    Past Medical History:  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 behavioral disturbance 6/10/2010       Allergies:  No Known Allergies    Current Medications:  Current Outpatient Prescriptions   Medication Sig     acetaminophen (TYLENOL) 500 MG tablet Take 1,000 mg by mouth daily. And 1-2 tablets as needed for pain BID     albuterol (PROVENTIL) 2.5 mg /3 mL (0.083 %) nebulizer solution Take 2.5 mg by nebulization every 4 (four) hours as needed for wheezing.     aluminum-magnesium hydroxide-simethicone (MAALOX ADVANCED) 200-200-20 mg/5 mL Susp Take 30 mL by mouth every 2 (two) hours as needed.     ascorbic acid (VITAMIN C) 500 MG tablet Take 500 mg by mouth daily.     bisacodyl (DULCOLAX) 10 mg suppository Insert 10 mg into the rectum as needed (constipation). Indications: Constipation     carbamide peroxide (DEBROX) 6.5 % otic solution Administer 3 drops into both ears as needed. BID for 3 days     dextromethorphan-guaiFENesin (ROBITUSSIN-DM)  mg/5 mL liquid Take 5 mL by mouth 2  (two) times a day as needed (cough).     ferrous sulfate 65 mg elemental iron Take 1 tablet by mouth daily with breakfast. 325mg daily     fluticasone (FLONASE) 50 mcg/actuation nasal spray 2 sprays into each nostril daily. Indications: Allergic Rhinitis     magnesium hydroxide (MAGNESIUM HYDROXIDE) 400 mg/5 mL Susp suspension Take 30 mL by mouth daily as needed.     metoprolol succinate (TOPROL-XL) 25 MG Take 25 mg by mouth daily.     ranitidine (ZANTAC) 150 MG tablet Take 150 mg by mouth daily.     senna-docusate (PERICOLACE) 8.6-50 mg tablet Take 1 tablet by mouth 2 (two) times a day. Do not administer if loose stools     terazosin (HYTRIN) 2 MG capsule Take 2 mg by mouth bedtime. Indications: SYMPTOMATIC BENIGN PROSTATIC HYPERPLASIA     triamcinolone (KENALOG) 0.1 % cream Apply 1 application topically 2 (two) times a day.     whey protein isolate (BENEPROTEIN) 6 gram-25 kcal/7 gram PwPk Take 1 packet by mouth 2 (two) times a day.       Review of Systems:  History obtained from chart review and the patient  Respiratory ROS: no cough, shortness of breath, or wheezing  Cardiovascular ROS: no chest pain or dyspnea on exertion  Gastrointestinal ROS: no abdominal pain, change in bowel habits, or black or bloody stools  Genito-Urinary ROS: no dysuria, trouble voiding, or hematuria  Musculoskeletal ROS: Some achiness in his right knee and right hip but increasing ambulatory ability with his walker by both his and nursing services report  Neurological ROS: no TIA or stroke symptoms  Dermatological ROS: he has a excoriated lesion on his chest that has not responded to hydrocortisone  plan to switch to triamcinolone cream         Laboratory:  No results for input(s): INR in the last 72 hours.  None    Physical Examination:  /63  Pulse 75  Temp 97.2  F (36.2  C)  Resp 20  Wt (!) 235 lb 3.2 oz (106.7 kg)  SpO2 93%  BMI 28.63 kg/m2  General appearance: alert, appears stated age, cooperative, fatigued, no distress,  mildly obese and slowed mentation  Neck: no adenopathy, no carotid bruit, no JVD, supple, symmetrical, trachea midline and thyroid not enlarged, symmetric, no tenderness/mass/nodules  Lungs: clear to auscultation bilaterally  Chest wall: no tenderness  Heart: regular rate and rhythm, S1, S2 normal, no murmur, click, rub or gallop  Abdomen: soft, non-tender; bowel sounds normal; no masses,  no organomegaly  Extremities: Some discomfort on motion of his right knee and full range of motion of his right hip although occasional  Slight pains noted  Pulses: symmetrical  Skin: excoriated eczematous looking rash on upper chest  Neurologic: Mental status: Alert, oriented, thought content appropriate  Motor:globally diminish strength worse in his post fracture right-sided lower extremity       Impression:  Neel Navarro is a 89 y.o. male with Chest dermatitis osteoarthritis post fracture of right acetabulum    1. Late onset Alzheimer's disease without behavioral disturbance     2. Diffuse dermatitis     3. Primary osteoarthritis involving multiple joints     4. Essential hypertension     5. Age-related osteoporosis without current pathological fracture     6. Combined fat and carbohydrate induced hyperlipemia     7. Fall, sequela     8. Closed nondisplaced fracture of acetabulum with routine healing, unspecified portion of acetabulum, unspecified laterality, subsequent encounter     9. Weakness of lower extremity, unspecified laterality     10. Stenosis of carotid artery, unspecified laterality     11. Enlarged prostate     12. Benign neoplasm of colon, unspecified part of colon         Plan: 0.1% triamcinolone cream b.i.d. Encourage range of motion and resumption of as much activity as he's able    Electronically signed by: Jose Howard Sr., MD

## 2021-06-13 NOTE — PROGRESS NOTES
[facility]    Facility:   [facility] [[id number]]   Code Status: DNR/DNI      CHIEF COMPLAINT/REASON FOR VISIT:  Chief Complaint   Patient presents with     Review Of Multiple Medical Conditions     routine visit       HISTORY:      HPI: Neel is a 89 y.o. male  who sustained a pelvic fracture after a fall. It was decided by family to not undergo surgery and he was discharged From [facility] on Dec 5th  back to [facility] with hospice.   Overall he is doing remarkably well. His pain is minimal and he no longer requires the use of oxycodone, He was discharged from hospice care.  He does also have progressive dementia with advanced late onset Alzheimers.  He continues to work with therapy as tolerated and staff tell me is progressing well with restorative care. He denies CP or SOB. His only concern was a rash around his collar bone. It is healing and he denies any pain or itching with it. He believes it may be related to the detergent used on his shirts. The rash was not noticed in any other areas. Overall he is doing well and will be going on a boat ride today set up by the facility.  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 behavioral disturbance 6/10/2010             Family History   Problem Relation Age of Onset     No Medical Problems Mother      Heart attack Father      Heart attack Brother      Social History     Social History     Marital status:      Spouse name: N/A     Number of children: N/A     Years of education: N/A     Occupational History           retired     Social History Main Topics     Smoking status: Never Smoker     Smokeless tobacco: Never Used     Alcohol use No     Drug use: No     Sexual activity: No      Other Topics Concern     Not on file     Social History Narrative    Formerly in [place] at [facility].  Niece is caretaker He has one son in area  6/2016         Review of Systems   Constitutional: Negative for appetite change, chills and fever.   HENT: Negative for congestion and sore throat.    Respiratory: Negative for cough and shortness of breath.    Cardiovascular: Positive for leg swelling. Negative for chest pain.   Gastrointestinal: Negative for abdominal distention, abdominal pain, constipation and diarrhea.   Genitourinary: Negative for dysuria.        Leg bag   Musculoskeletal: Positive for arthralgias.        Right hip   Skin: Positive for rash.   Neurological: Negative for dizziness.   Psychiatric/Behavioral: Negative for sleep disturbance.       .  Vitals:    09/26/17 2153   BP: 122/67   Pulse: 78   Resp: 23   Temp: 98.8  F (37.1  C)   SpO2: 92%   Weight: (!) 233 lb (105.7 kg)       Physical Exam   Constitutional: He appears well-nourished.   HENT:   Head: Normocephalic.   Eyes: Conjunctivae are normal.   Cardiovascular: Normal rate, regular rhythm and normal heart sounds.    No murmur heard.  Pulmonary/Chest: Effort normal and breath sounds normal. He has no wheezes. He has no rales.   Abdominal: Soft. Bowel sounds are normal. He exhibits no distension. There is no tenderness.   Genitourinary:   Genitourinary Comments: Flower with clear yellow urine.   Musculoskeletal: He exhibits edema.   1+bilateral t lower ext   Neurological: He is alert.   Skin: Skin is warm and dry. Rash noted.   Resolving rash around collar bone pt believes it may be related to detergent used on his shirts.   Psychiatric: He has a normal mood and affect. His behavior is normal.         LABS:   No recent labs    ASSESSMENT:      ICD-10-CM    1. Fall, sequela W19.XXXS    2. Primary osteoarthritis involving multiple joints M15.0    3. Gout M10.9    4. Benign neoplasm of colon, unspecified part of colon D12.6    5. Closed nondisplaced  fracture of acetabulum with routine healing S32.409D        PLAN:    Rash anterior collarbone- hydrocortisone cream BID and PRN  Pt no longer on oxycodone-pain tolerable  with current medications.   Physical therapy currently working with restorative care  Otherwise resident is doing well and has no other concerns.   Lower ext edema- Elevate legs while up in the chair.Compression stockings on during the day and off at night     Electronically signed by: Mckenna Douglass CNP

## 2021-06-14 NOTE — PROGRESS NOTES
[facility]    Facility:   [facility] [[id number]]   Code Status: DNR/DNI      CHIEF COMPLAINT/REASON FOR VISIT:  Chief Complaint   Patient presents with     Review Of Multiple Medical Conditions     routine visit       HISTORY:      HPI: Neel is a 89 y.o. male  who sustained a pelvic fracture after a fall. It was decided by family to not undergo surgery and he was discharged From [facility] on Dec 5th  back to [facility] with hospice.   Overall he is doing remarkably well. His pain is minimal and he no longer requires the use of oxycodone, He was discharged from hospice care.  He did report an episode of his right leg giving out on him as he was getting weighed.  He does also have progressive dementia with advanced late onset Alzheimers.  He continues to work with restorative aid for ambulation.. He denies CP or SOB.  However he appeared SOB to writer. He also has a rash around his collarbone that appears to be getting worse. It has extended to his upper back and upper bilateral arms.  He was seen by dermatology who suspect it may be a drug reaction to zantac.  A  Recent chest xray was normal however his sats were 90% and he appeared SOB. A dimer was ordered and came back elevated. He was sent to [facility] ER for further evaluation.   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 behavioral disturbance 6/10/2010             Family History   Problem Relation Age of Onset     No Medical Problems Mother      Heart attack Father      Heart attack Brother      Social History     Social History     Marital status:      Spouse name: N/A     Number of children: N/A     Years of education: N/A     Occupational History           retired      Social History Main Topics     Smoking status: Never Smoker     Smokeless tobacco: Never Used     Alcohol use No     Drug use: No     Sexual activity: No     Other Topics Concern     Not on file     Social History Narrative    Formerly in [place] at [facility].  Niece is caretaker He has one son in area  6/2016         Review of Systems   Constitutional: Negative for appetite change, chills and fever.   HENT: Negative for congestion and sore throat.    Respiratory: Negative for cough and shortness of breath.    Cardiovascular: Positive for leg swelling. Negative for chest pain.   Gastrointestinal: Negative for abdominal distention, abdominal pain, constipation and diarrhea.   Genitourinary: Negative for dysuria.        Leg bag   Musculoskeletal: Positive for arthralgias.        Right hip   Skin: Positive for rash.   Neurological: Negative for dizziness.   Psychiatric/Behavioral: Negative for sleep disturbance.       .  Vitals:    11/21/17 1630   BP: 138/69   Pulse: 86   Resp: 18   Temp: 98.1  F (36.7  C)   SpO2: 90%   Weight: (!) 247 lb 6.4 oz (112.2 kg)       Physical Exam   Constitutional: He appears well-nourished.   HENT:   Head: Normocephalic.   Eyes: Conjunctivae are normal.   Cardiovascular: Normal rate, regular rhythm and normal heart sounds.    No murmur heard.  Pulmonary/Chest: Effort normal. He has wheezes. He has no rales.   Upper left anterior expiratory wheeze.   Abdominal: Soft. Bowel sounds are normal. He exhibits no distension. There is no tenderness.   Genitourinary:   Genitourinary Comments: Flower with clear yellow urine.   Musculoskeletal: He exhibits edema.   1-2+bilateral t lower ext   Neurological: He is alert.   Skin: Skin is warm and dry. Rash noted.   Rash appears to be getting worse. He has it on his upper chest, back and bilateral upper arms. Denies SOB or difficulty swallowing however he appears SOB     Right great toe wound healing well per nursing.   Psychiatric: He has a normal mood and  affect. His behavior is normal.         LABS:   No recent labs- BMP to be done in 1 week due to starting lasix.     Current Outpatient Prescriptions   Medication Sig     acetaminophen (TYLENOL) 500 MG tablet Take 1,000 mg by mouth daily.      albuterol (PROVENTIL) 2.5 mg /3 mL (0.083 %) nebulizer solution Take 2.5 mg by nebulization every 4 (four) hours as needed for wheezing.     aluminum-magnesium hydroxide-simethicone (MAALOX ADVANCED) 200-200-20 mg/5 mL Susp Take 30 mL by mouth every 2 (two) hours as needed.     ascorbic acid (VITAMIN C) 500 MG tablet Take 500 mg by mouth 2 (two) times a day.      bisacodyl (DULCOLAX) 10 mg suppository Insert 10 mg into the rectum as needed (constipation). Indications: Constipation     calamine lotion Apply 1 application topically 3 (three) times a day as needed. Apply to affected areas  Topically as needed for itching after the [facility] cream is used      cetirizine (ZYRTEC) 10 MG tablet Take 10 mg by mouth daily.     dextromethorphan-guaiFENesin (ROBITUSSIN-DM)  mg/5 mL liquid Take 10 mL by mouth 2 (two) times a day as needed (cough).      ferrous sulfate 65 mg elemental iron Take 1 tablet by mouth daily with breakfast. 325mg daily     fluticasone (FLONASE) 50 mcg/actuation nasal spray 1 spray into each nostril daily.      furosemide (LASIX) 20 MG tablet Take 20 mg by mouth daily.     magnesium hydroxide (MAGNESIUM HYDROXIDE) 400 mg/5 mL Susp suspension Take 30 mL by mouth daily as needed.     metoprolol succinate (TOPROL-XL) 25 MG Take 25 mg by mouth daily.     senna-docusate (PERICOLACE) 8.6-50 mg tablet Take 1 tablet by mouth 2 (two) times a day. Do not administer if loose stools     terazosin (HYTRIN) 2 MG capsule Take 2 mg by mouth bedtime. Indications: SYMPTOMATIC BENIGN PROSTATIC HYPERPLASIA     whey protein isolate (BENEPROTEIN) 6 gram-25 kcal/7 gram PwPk Take 1 packet by mouth 2 (two) times a day.     acetaminophen (TYLENOL) 500 MG tablet Take 500-1,000 mg by mouth 2  (two) times a day as needed for pain. 500 mg for pain score of 1-5/10 and 1000 mg for pain score of 6-10/10. Ensure that 1000 mg is not exceeded in 4 hours, and do not exceed 3 grams in 24 hours.     carbamide peroxide (DEBROX) 6.5 % otic solution Administer 3 drops into both ears see administration instructions. Instill 3 drops in both ears as needed for ear wax build up twice daily X 3 days. Irrigate using 30 mL syringe and warm water.     hydrOXYzine (ATARAX) 25 MG tablet Take 25 mg by mouth at bedtime.       ASSESSMENT:      ICD-10-CM    1. Closed nondisplaced fracture of acetabulum with routine healing, unspecified portion of acetabulum, unspecified laterality, subsequent encounter S32.409D    2. Weakness of lower extremity, unspecified laterality R29.898    3. Weight gain R63.5    4. Wheezing R06.2        PLAN:    SOB observed but pt denied when asked, chest xray within normal limits. D- dimer ordered and came back elevated. Pt was sent to the ER for further evaluation.  Rash anterior collarbone, back  and bilateral upper arms.- Triamcinolone  cream per dermatology He will follow up again December 11th, 2017  Pt no longer on oxycodone-pain tolerable  with current medications.  Physical therapy currently working with restorative care  Otherwise resident is doing well and has no other concerns.   Lower ext edema- Elevate legs while up in the chair.Compression stockings on during the day and off at night , lasix recently initiated.     Electronically signed by: Mckenna Douglass CNP

## 2021-06-15 NOTE — PROGRESS NOTES
[facility]    Facility:   [facility] [[id number]]   Code Status: DNR/DNI      CHIEF COMPLAINT/REASON FOR VISIT:  Chief Complaint   Patient presents with     Review Of Multiple Medical Conditions       HISTORY:      HPI: Neel is a 89 y.o. male  who sustained a pelvic fracture after a fall. It was decided by family to not undergo surgery and he was discharged From [facility] on Dec 5th, 2016  back to [facility] with hospice.   Overall he is doing remarkably well. His pain is minimal and he no longer requires the use of oxycodone, He was discharged from hospice care.    He does also have progressive dementia with advanced late onset Alzheimers.  He continues to work with restorative aid for ambulation.. He denies CP or SOB.  However he had audible wheezing without the use of a stethoscope.  I have changed his nebs to scheduled and PRN, will check an x-ray and labs. His prior rash is resolving but he continues to have itching and I have ordered him sarna. He is being followed by dermatology for a right great toe ulcer and is on scheduled dressing changes.       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 behavioral disturbance 6/10/2010     Right bundle branch block              Family History   Problem Relation Age of Onset     No Medical Problems Mother      Heart attack Father      Heart attack Brother      Social History     Social History     Marital status:      Spouse name: N/A     Number of children: N/A     Years of education: N/A     Occupational History           retired     Social History Main Topics     Smoking status: Never Smoker     Smokeless tobacco: Never Used     Alcohol use No     Drug use: No     Sexual activity: No      Other Topics Concern     Not on file     Social History Narrative    Formerly in [place] at [facility].  Niece is caretaker He has one son in area  6/2016         Review of Systems   Constitutional: Negative for appetite change, chills and fever.   HENT: Negative for congestion and sore throat.    Respiratory: Positive for cough and wheezing. Negative for shortness of breath.         Denied SOB but had audible wheezes  Yellow sputum- afebrile.    Cardiovascular: Positive for leg swelling. Negative for chest pain.   Gastrointestinal: Negative for abdominal distention, abdominal pain, constipation and diarrhea.   Genitourinary: Negative for dysuria.        Leg bag   Musculoskeletal: Positive for arthralgias.        Right hip   Skin: Positive for rash.   Neurological: Negative for dizziness.   Psychiatric/Behavioral: Negative for sleep disturbance.       .  Vitals:    01/24/18 1958   BP: 105/60   Pulse: 95   Resp: 18   Temp: 97.3  F (36.3  C)   SpO2: 98%   Weight: (!) 249 lb (112.9 kg)       Physical Exam   Constitutional: He appears well-developed and well-nourished.   HENT:   Head: Normocephalic.   Eyes: Conjunctivae are normal.   Cardiovascular: Normal rate, regular rhythm and normal heart sounds.    No murmur heard.  Pulmonary/Chest: Effort normal. He has wheezes. He has no rales.   Throughout all fields.    Abdominal: Soft. Bowel sounds are normal. He exhibits no distension. There is no tenderness.   Genitourinary:   Genitourinary Comments: Flower with clear yellow urine.   Musculoskeletal: He exhibits edema.   1-2+bilateral t lower ext   Neurological: He is alert.   Skin: Skin is warm and dry. Rash noted.   Rash appears to be getting worse. He has it on his upper chest, back and bilateral upper arms. Denies SOB or difficulty swallowing however he appears SOB     Right great toe wound healing well per nursing.   Psychiatric: He has a normal mood and affect. His behavior is normal.         LABS:   No results found for this  or any previous visit (from the past 240 hour(s)).  Labs ordered    Current Outpatient Prescriptions   Medication Sig     acetaminophen (TYLENOL) 500 MG tablet Take 1,000 mg by mouth daily.      acetaminophen (TYLENOL) 500 MG tablet Take 500-1,000 mg by mouth 2 (two) times a day as needed for pain. 500 mg for pain score of 1-5/10 and 1000 mg for pain score of 6-10/10. Ensure that 1000 mg is not exceeded in 4 hours, and do not exceed 3 grams in 24 hours.     albuterol (PROVENTIL) 2.5 mg /3 mL (0.083 %) nebulizer solution Take 2.5 mg by nebulization 2 (two) times a day. And q 4 hours PRN     aluminum-magnesium hydroxide-simethicone (MAALOX ADVANCED) 200-200-20 mg/5 mL Susp Take 30 mL by mouth every 2 (two) hours as needed.     ascorbic acid (VITAMIN C) 500 MG tablet Take 500 mg by mouth 2 (two) times a day.      bisacodyl (DULCOLAX) 10 mg suppository Insert 10 mg into the rectum as needed (constipation). Indications: Constipation     calamine lotion Apply 1 application topically 3 (three) times a day as needed. Apply to affected areas  Topically as needed for itching after the TMC cream is used      camphor-menthol (SARNA) lotion Apply 1 application topically as needed for itching. Apply to abdomen and chest daily and PRN     carbamide peroxide (DEBROX) 6.5 % otic solution Administer 3 drops into both ears see administration instructions. Instill 3 drops in both ears as needed for ear wax build up twice daily X 3 days. Irrigate using 30 mL syringe and warm water.     cetirizine (ZYRTEC) 10 MG tablet Take 10 mg by mouth daily.     dextromethorphan-guaiFENesin (ROBITUSSIN-DM)  mg/5 mL liquid Take 10 mL by mouth 2 (two) times a day as needed (cough).      ferrous sulfate 65 mg elemental iron Take 1 tablet by mouth daily with breakfast. 325mg daily     fluticasone (FLONASE) 50 mcg/actuation nasal spray 1 spray into each nostril daily.      furosemide (LASIX) 20 MG tablet Take 20 mg by mouth daily.     gentamicin  (GARAMYCIN) 0.1 % ointment Apply 1 application topically 3 (three) times a day.     hydrOXYzine (ATARAX) 25 MG tablet Take 25 mg by mouth at bedtime.     losartan (COZAAR) 50 MG tablet Take 50 mg by mouth daily.     magnesium hydroxide (MAGNESIUM HYDROXIDE) 400 mg/5 mL Susp suspension Take 30 mL by mouth daily as needed.     senna-docusate (PERICOLACE) 8.6-50 mg tablet Take 1 tablet by mouth 2 (two) times a day. Do not administer if loose stools     terazosin (HYTRIN) 2 MG capsule Take 2 mg by mouth bedtime. Indications: SYMPTOMATIC BENIGN PROSTATIC HYPERPLASIA     whey protein isolate (BENEPROTEIN) 6 gram-25 kcal/7 gram PwPk Take 1 packet by mouth 2 (two) times a day.       ASSESSMENT:      ICD-10-CM    1. Essential hypertension I10    2. Weakness of lower extremity, unspecified laterality R29.898    3. Late onset Alzheimer's disease without behavioral disturbance G30.1     F02.80    4. Gout M10.9    5. 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  chest xray, scheduled nebs ordered along with labs.   Rash anterior collarbone, back  and bilateral upper arms.- resolving continues to have itching chest and abdomen -sarna lotion daily and prn  Pt no longer on oxycodone-pain tolerable  with current medications.  Physical therapy currently working with restorative care  Otherwise resident is doing well and has no other concerns.   Lower ext edema- Elevate legs while up in the chair.Compression stockings on during the day and off at night , lasix recently initiated.     Electronically signed by: Mckenna Douglass, CNP

## 2021-06-15 NOTE — PROGRESS NOTES
[facility]    NAME: Neel Navarro  : 10/12/1920           MR# [id number]           Code Status:  DNR/DNI  Visit Type: Problem Visit (foot sores) and Review Of Multiple Medical Conditions     Facility:  [facility]  [[id number]]       Facility Type:  (Long Term Care, LTC)    Chief Complaint   Patient presents with     Problem Visit     foot sores     Review Of Multiple Medical Conditions       History of Present Illness: Neel is a 89 y.o. male that is new to me today.  He has been wheelchair-bound mainly since he in 2016 after a fall with an acetabular fracture.  He has also had problems with a rash that seems to cover his chest and his back problem migraine.  He has had various medications started and stopped and they have tried various creams and other medications for the itching and the rash currently it seems to be fairly quiescent.  Therapy would like to get him walking in recommend some type of pain management.  He has a chronic indwelling Flower catheter.  Recently, he has been having some ulcerations on his toes to the point where they have cut out around the great toe on both shoes so that the dressings fit and they do not rub.  He denies any pain.    Patient Active Problem List   Diagnosis     Angina pectoris     Carotid artery narrowing     Benign neoplasm of colon     Late onset Alzheimer's disease without behavioral disturbance     Enlarged prostate     Gout     Leg weakness     Neuropathy     Primary osteoarthritis involving multiple joints     OP (osteoporosis)     Peripheral blood vessel disorder     Vitamin D deficiency     Essential hypertension     Closed nondisplaced fracture of acetabulum with routine healing     Fall     Allergic dermatitis     Dermatitis     Urinary retention     Gastrointestinal bleeding, upper       Current Medications:  Current Outpatient Prescriptions   Medication Sig     acetaminophen (TYLENOL) 500 MG tablet  Take 1,000 mg by mouth daily.      acetaminophen (TYLENOL) 500 MG tablet Take 500-1,000 mg by mouth 2 (two) times a day as needed for pain. 500 mg for pain score of 1-5/10 and 1000 mg for pain score of 6-10/10. Ensure that 1000 mg is not exceeded in 4 hours, and do not exceed 3 grams in 24 hours.     albuterol (PROVENTIL) 2.5 mg /3 mL (0.083 %) nebulizer solution Take 2.5 mg by nebulization 2 (two) times a day. And q 4 hours PRN     aluminum-magnesium hydroxide-simethicone (MAALOX ADVANCED) 200-200-20 mg/5 mL Susp Take 30 mL by mouth every 2 (two) hours as needed.     ascorbic acid (VITAMIN C) 500 MG tablet Take 500 mg by mouth 2 (two) times a day.      bisacodyl (DULCOLAX) 10 mg suppository Insert 10 mg into the rectum as needed (constipation). Indications: Constipation     calamine lotion Apply 1 application topically 3 (three) times a day as needed. Apply to affected areas  Topically as needed for itching after the TMC cream is used      camphor-menthol (SARNA) lotion Apply 1 application topically as needed for itching. Apply to abdomen and chest daily and PRN     carbamide peroxide (DEBROX) 6.5 % otic solution Administer 3 drops into both ears see administration instructions. Instill 3 drops in both ears as needed for ear wax build up twice daily X 3 days. Irrigate using 30 mL syringe and warm water.     cetirizine (ZYRTEC) 10 MG tablet Take 10 mg by mouth daily.     dextromethorphan-guaiFENesin (ROBITUSSIN-DM)  mg/5 mL liquid Take 10 mL by mouth 2 (two) times a day as needed (cough).      ferrous sulfate 65 mg elemental iron Take 1 tablet by mouth daily with breakfast. 325mg daily     fluticasone (FLONASE) 50 mcg/actuation nasal spray 1 spray into each nostril daily.      furosemide (LASIX) 20 MG tablet Take 20 mg by mouth daily.     gentamicin (GARAMYCIN) 0.1 % ointment Apply 1 application topically 3 (three) times a day.     hydrOXYzine (ATARAX) 25 MG tablet Take 25 mg by mouth at bedtime.     losartan  (COZAAR) 50 MG tablet Take 50 mg by mouth daily.     magnesium hydroxide (MAGNESIUM HYDROXIDE) 400 mg/5 mL Susp suspension Take 30 mL by mouth daily as needed.     senna-docusate (PERICOLACE) 8.6-50 mg tablet Take 1 tablet by mouth 2 (two) times a day. Do not administer if loose stools     terazosin (HYTRIN) 2 MG capsule Take 2 mg by mouth bedtime. Indications: SYMPTOMATIC BENIGN PROSTATIC HYPERPLASIA     whey protein isolate (BENEPROTEIN) 6 gram-25 kcal/7 gram PwPk Take 1 packet by mouth 2 (two) times a day.       ROS A comprehensive review of systems was performed and was otherwise negative    Social History:   Social History     Social History Narrative    He is from [place] and is a retired .  He was  in  and has one son alive, one daughter  of breast cancer. His niece is his main caretaker.       Medications were reconciled.  Allergies, social and family history, and the problem list were all reviewed and updated.    Old records reviewed: Previous records    Exam  General Appearance: Alert, minor forgetfulness  Vitals:    18 0811   BP: 115/66   Pulse: 74   Resp: 18   Temp: 97.1  F (36.2  C)   SpO2: 94%   Weight: (!) 250 lb 12.8 oz (113.8 kg)      Body mass index is 30.53 kg/(m^2).  Wt Readings from Last 3 Encounters:   18 (!) 250 lb 12.8 oz (113.8 kg)   18 (!) 249 lb (112.9 kg)   17 (!) 243 lb (110.2 kg)     HEENT: Sclera are clear.   Lungs: Normal respirations  Cardiac: Regular rate and rhythm  Abdomen: Soft and nondistended  Extremities: He has erythematous first metatarsals bilaterally with ulcerations that look like they could be gouty tophi that have broken open  Skin: Eczematous/contact dermatitis type rash that scattered chest  Neuro: Moves all extremities and has facial symmetry  Gait: Wheelchair with a leg Flower catheter bag    Labs:  All recent labs reviewed in the nursing home record and Epic.    Lab Results   Component Value Date    NA  138 11/27/2017    K 4.4 11/27/2017     11/27/2017    CO2 28 11/27/2017    BUN 28 11/27/2017    CREATININE 1.01 11/27/2017    CALCIUM 8.8 11/27/2017     Lab Results   Component Value Date    WBC 8.0 01/25/2018    HGB 11.0 (L) 01/25/2018    HCT 34.3 (L) 01/25/2018    MCV 92 01/25/2018     01/25/2018     No results found for: HGBA1C    Assessment/Plan  1. Ulcers of both great toes (new and worsening problem)  We will check a uric acid level, I suspect he may have gouty tophi and would then benefit from allopurinol to help dissolve these.  Really should get new shoes fitted could also see podiatry.    2. Essential hypertension  Stable, DC terazosin as this seems to have been mainly for BPH and he now has a Flower catheter in place.    3. Late onset Alzheimer's disease without behavioral disturbance  Overall he is pleasant stable with no behaviors.    4. Gout  I suspect he has gout as noted above and would benefit from intervention.    5. Dermatitis  Continue with Sarna lotion and other as needed meds for now.  He has follow-up with dermatology this month.    6. Urinary retention  Continue with a Flower catheter.    7.  Acetabular fracture and decreased mobility  Would recommend scheduling some meloxicam to help with pain issues and having therapy start to work on transfers.  He is far enough out from this that he should be able to ambulate.    Electronically signed by:   Yessenia Pineda MD  Internal and Geriatric medicine

## 2021-06-16 PROBLEM — R13.19 OTHER DYSPHAGIA: Status: ACTIVE | Noted: 2018-04-06

## 2021-06-16 PROBLEM — R33.9 URINARY RETENTION: Status: ACTIVE | Noted: 2018-02-03

## 2021-06-16 PROBLEM — L23.9 ALLERGIC DERMATITIS: Status: ACTIVE | Noted: 2017-12-13

## 2021-06-16 PROBLEM — R06.2 WHEEZING: Status: ACTIVE | Noted: 2018-03-21

## 2021-06-16 PROBLEM — L30.9 DERMATITIS: Status: ACTIVE | Noted: 2018-02-03

## 2021-06-16 NOTE — TELEPHONE ENCOUNTER
Telephone Encounter by Vikram Francis RN at 5/8/2019 12:09 PM     Author: Vikram Francis RN Service: -- Author Type: Registered Nurse    Filed: 5/8/2019 12:16 PM Encounter Date: 5/8/2019 Status: Signed    : Vikram Francis RN (Registered Nurse)       [facility] Nurse Triage Telephone Note      Provider: Melina Mackenzie MD  Facility: [facility]    Facility Type: [facility]    Caller: Jessica  Call Back Number:  [phone]    Allergies: Ranitidine    Reason for call: Nurse calling to report the results of the abdominal x-ray that was ordered by MD on 5/7/19.  Today, no stools over the night or on day shift today.  VSS.  No abdominal pain or distention.  BS active x 4.  See abdominal x-ray results below:               Verbal Order/Direction given by Provider: Full liquid diet x 48 hours, then advance diet as tolerated.      Provider giving order: Melina Mackenzie MD    Verbal order given to: Jessica Francis RN

## 2021-06-16 NOTE — PROGRESS NOTES
[facility]    Facility:   [facility] [[id number]]   Code Status: DNR/DNI      CHIEF COMPLAINT/REASON FOR VISIT:  Chief Complaint   Patient presents with     Review Of Multiple Medical Conditions       HISTORY:      HPI: Neel is a 89 y.o. male  who sustained a pelvic fracture after a fall. It was decided by family to not undergo surgery and he was discharged From [facility] on Dec 5th, 2016  back to [facility] with hospice.   Overall he is doing remarkably well. His pain is minimal and he no longer requires the use of oxycodone, He was discharged from hospice care.    He does also have progressive dementia with advanced late onset Alzheimers.  He continues to work with restorative aid for ambulation.. He denies CP or SOB.  However he continues to have wheezing despite nebulizer. He denies SOB. I am told he was on honey thickened liquids back in  and returned to thin liquids for quality of life. I have asked for speech to reevaluate him. If he continues on thin liquids against medical advice I have asked for family to sign a waiver that he may continue on the thin liquids.  His prior rash has resolved.  He continues to have an ulcer on the top of his right great toe in which he gets dressing changes done. He tells me it is getting smaller. He has a cough and tells me the sputum is clear.    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 behavioral disturbance 6/10/2010     Right bundle branch block              Family History   Problem Relation Age of Onset     Other Mother       of old age in her 80s     Heart attack Father       age 78     Heart attack Brother      Breast cancer Daughter      Social History     Social History     Marital status:       Spouse name: N/A     Number of children: N/A     Years of education: N/A     Occupational History           retired     Social History Main Topics     Smoking status: Never Smoker     Smokeless tobacco: Never Used     Alcohol use No     Drug use: No     Sexual activity: No     Other Topics Concern     Not on file     Social History Narrative    He is from [place] and is a retired .  He was  in 2 and has one son alive, one daughter  of breast cancer. His niece is his main caretaker.         Review of Systems   Constitutional: Negative for appetite change, chills and fever.   HENT: Negative for congestion and sore throat.    Respiratory: Positive for cough and wheezing. Negative for shortness of breath.         Denied SOB but had audible wheezes  clear sputum- afebrile.    Cardiovascular: Positive for leg swelling. Negative for chest pain.   Gastrointestinal: Negative for abdominal distention, abdominal pain, constipation and diarrhea.   Genitourinary: Negative for dysuria.        Leg bag   Musculoskeletal: Positive for arthralgias.        Right hip   Skin:        Ulcer top of right great toe   Neurological: Negative for dizziness.   Psychiatric/Behavioral: Negative for sleep disturbance.       .  Vitals:    18 1639   BP: 120/60   Pulse: 83   Resp: 18   Temp: 97.9  F (36.6  C)   SpO2: 93%   Weight: (!) 247 lb 12.8 oz (112.4 kg)       Physical Exam   Constitutional: He appears well-developed and well-nourished.   HENT:   Head: Normocephalic.   Eyes: Conjunctivae are normal.   Cardiovascular: Normal rate, regular rhythm and normal heart sounds.    No murmur heard.  Pulmonary/Chest: Effort normal. He has wheezes. He has no rales.   Throughout all fields.    Abdominal: Soft. Bowel sounds are normal. He exhibits no distension. There is no tenderness.   Genitourinary:   Genitourinary Comments: Flower with clear yellow urine.   Musculoskeletal: He  exhibits edema.   1-2+bilateral t lower ext   Neurological: He is alert.   Skin: Skin is warm and dry.   Rash has resolved.   Right great toe wound smaller per pt.   Psychiatric: He has a normal mood and affect. His behavior is normal.         LABS:   No results found for this or any previous visit (from the past 240 hour(s)).    Current Outpatient Prescriptions   Medication Sig     acetaminophen (TYLENOL) 500 MG tablet Take 1,000 mg by mouth daily.      acetaminophen (TYLENOL) 500 MG tablet Take 500-1,000 mg by mouth 2 (two) times a day as needed for pain. 500 mg for pain score of 1-5/10 and 1000 mg for pain score of 6-10/10. Ensure that 1000 mg is not exceeded in 4 hours, and do not exceed 3 grams in 24 hours.     albuterol (PROVENTIL) 2.5 mg /3 mL (0.083 %) nebulizer solution Take 2.5 mg by nebulization 2 (two) times a day. And q 4 hours PRN     aluminum-magnesium hydroxide-simethicone (MAALOX ADVANCED) 200-200-20 mg/5 mL Susp Take 30 mL by mouth every 2 (two) hours as needed.     ascorbic acid (VITAMIN C) 500 MG tablet Take 500 mg by mouth 2 (two) times a day.      bisacodyl (DULCOLAX) 10 mg suppository Insert 10 mg into the rectum as needed (constipation). Indications: Constipation     calamine lotion Apply 1 application topically 3 (three) times a day as needed. Apply to affected areas  Topically as needed for itching after the TMC cream is used      camphor-menthol (SARNA) lotion Apply 1 application topically as needed for itching. Apply to abdomen and chest daily and PRN     carbamide peroxide (DEBROX) 6.5 % otic solution Administer 3 drops into both ears see administration instructions. Instill 3 drops in both ears as needed for ear wax build up twice daily X 3 days. Irrigate using 30 mL syringe and warm water.     cetirizine (ZYRTEC) 10 MG tablet Take 10 mg by mouth daily.     clobetasol (TEMOVATE) 0.05 % cream Apply 1 application topically 2 (two) times a day as needed.     dextromethorphan-guaiFENesin  (ROBITUSSIN-DM)  mg/5 mL liquid Take 10 mL by mouth 2 (two) times a day as needed (cough).      ferrous sulfate 65 mg elemental iron Take 1 tablet by mouth daily with breakfast. 325mg daily     fluticasone (FLONASE) 50 mcg/actuation nasal spray 1 spray into each nostril daily.      furosemide (LASIX) 20 MG tablet Take 20 mg by mouth daily.     gentamicin (GARAMYCIN) 0.1 % ointment Apply 1 application topically 3 (three) times a day.     hydrOXYzine (ATARAX) 25 MG tablet Take 25 mg by mouth at bedtime.     losartan (COZAAR) 50 MG tablet Take 50 mg by mouth daily.     magnesium hydroxide (MAGNESIUM HYDROXIDE) 400 mg/5 mL Susp suspension Take 30 mL by mouth daily as needed.     meloxicam (MOBIC) 7.5 MG tablet Take 1 tablet (7.5 mg total) by mouth daily.     senna-docusate (PERICOLACE) 8.6-50 mg tablet Take 1 tablet by mouth 2 (two) times a day. Do not administer if loose stools     whey protein isolate (BENEPROTEIN) 6 gram-25 kcal/7 gram PwPk Take 1 packet by mouth 2 (two) times a day.       ASSESSMENT:      ICD-10-CM    1. Urinary retention R33.9    2. Essential hypertension I10    3. Late onset Alzheimer's disease without behavioral disturbance G30.1     F02.80    4. Wheezing R06.2    5. Allergic dermatitis L23.9        PLAN:    SOB observed but pt denied when asked, audible wheezes throughout all fields.   scheduled nebs, speech evaluation  Rash resolved  Pt no longer on oxycodone-pain tolerable  with current medications.  Physical therapy currently working with restorative care  Otherwise resident is doing well and has no other concerns.   Lower ext edema- Elevate legs while up in the chair.Compression stockings on during the day and off at night. On Lasix    Electronically signed by: Mckenna Douglass CNP

## 2021-06-17 NOTE — PROGRESS NOTES
[facility]    NAME: Neel Navarro  : 10/12/1920           MR# [id number]           Code Status:  DNR/DNI  Visit Type: Problem Visit (wheezing) and Review Of Multiple Medical Conditions     Facility:  [facility]  [[id number]]       Facility Type: SNF (Skilled Nursing Facility, TCU)    Chief Complaint   Patient presents with     Problem Visit     wheezing     Review Of Multiple Medical Conditions       History of Present Illness: Neel is a 89 y.o. male that recently was treated for pneumonia.  He is also been having some difficulty swallowing and potential aspiration.  He has had a previous swallow evaluation Stefania years ago and barium swallow showing aspiration of everything except for honey thickened liquids which seems to be exacerbated when he is ill.  A repeat barium swallow has been ordered but his healthcare power of , his niece, has not felt it was necessary.  They have tried honey thickened liquids and he states that it is fine to continue to do so.  He does tell me that he misses plain water.  He still seems short of breath with activity and continues to use his wheelchair for ambulation and has tennis shoes the holes cut out at the big toes.    Patient Active Problem List   Diagnosis     Angina pectoris     Carotid artery narrowing     Benign neoplasm of colon     Late onset Alzheimer's disease without behavioral disturbance     Enlarged prostate     Gout     Leg weakness     Neuropathy     Primary osteoarthritis involving multiple joints     OP (osteoporosis)     Peripheral blood vessel disorder     Vitamin D deficiency     Essential hypertension     Closed nondisplaced fracture of acetabulum with routine healing     Fall     Allergic dermatitis     Dermatitis     Urinary retention     Gastrointestinal bleeding, upper     Wheezing     Other dysphagia       Current Medications:  Current Outpatient Prescriptions   Medication Sig      acetaminophen (TYLENOL) 500 MG tablet Take 1,000 mg by mouth daily.      acetaminophen (TYLENOL) 500 MG tablet Take 500-1,000 mg by mouth 2 (two) times a day as needed for pain. 500 mg for pain score of 1-5/10 and 1000 mg for pain score of 6-10/10. Ensure that 1000 mg is not exceeded in 4 hours, and do not exceed 3 grams in 24 hours.     albuterol (PROVENTIL) 2.5 mg /3 mL (0.083 %) nebulizer solution Take 2.5 mg by nebulization 2 (two) times a day. And q 4 hours PRN     aluminum-magnesium hydroxide-simethicone (MAALOX ADVANCED) 200-200-20 mg/5 mL Susp Take 30 mL by mouth every 2 (two) hours as needed.     ascorbic acid (VITAMIN C) 500 MG tablet Take 500 mg by mouth 2 (two) times a day.      bisacodyl (DULCOLAX) 10 mg suppository Insert 10 mg into the rectum as needed (constipation). Indications: Constipation     calamine lotion Apply 1 application topically 3 (three) times a day as needed. Apply to affected areas  Topically as needed for itching after the TMC cream is used      camphor-menthol (SARNA) lotion Apply 1 application topically as needed for itching. Apply to abdomen and chest daily and PRN     carbamide peroxide (DEBROX) 6.5 % otic solution Administer 3 drops into both ears see administration instructions. Instill 3 drops in both ears as needed for ear wax build up twice daily X 3 days. Irrigate using 30 mL syringe and warm water.     cetirizine (ZYRTEC) 10 MG tablet Take 10 mg by mouth daily.     clobetasol (TEMOVATE) 0.05 % cream Apply 1 application topically 2 (two) times a day as needed.     dextromethorphan-guaiFENesin (ROBITUSSIN-DM)  mg/5 mL liquid Take 10 mL by mouth 2 (two) times a day as needed (cough).      ferrous sulfate 65 mg elemental iron Take 1 tablet by mouth daily with breakfast. 325mg daily     fluticasone (FLONASE) 50 mcg/actuation nasal spray 1 spray into each nostril daily.      furosemide (LASIX) 20 MG tablet Take 20 mg by mouth daily.     gentamicin (GARAMYCIN) 0.1 %  ointment Apply 1 application topically 3 (three) times a day.     hydrOXYzine (ATARAX) 25 MG tablet Take 25 mg by mouth at bedtime.     losartan (COZAAR) 50 MG tablet Take 50 mg by mouth daily.     magnesium hydroxide (MAGNESIUM HYDROXIDE) 400 mg/5 mL Susp suspension Take 30 mL by mouth daily as needed.     meloxicam (MOBIC) 7.5 MG tablet Take 1 tablet (7.5 mg total) by mouth daily.     senna-docusate (PERICOLACE) 8.6-50 mg tablet Take 1 tablet by mouth 2 (two) times a day. Do not administer if loose stools     whey protein isolate (BENEPROTEIN) 6 gram-25 kcal/7 gram PwPk Take 1 packet by mouth 2 (two) times a day.       ROS A comprehensive review of systems was performed and was otherwise negative    Social History:   Social History     Social History Narrative    He is from [place] and is a retired .  He was  in 1962 and has one son alive, one daughter  of breast cancer. His niece is his main caretaker.       Medications were reconciled.  Allergies, social and family history, and the problem list were all reviewed and updated.    Old records reviewed: Previous swallowing evaluations.    Exam  General Appearance: Alert, sitting up in a wheelchair.  Vitals:    18 1845   BP: 126/61   Pulse: 73   Temp: 97.8  F (36.6  C)   Weight: (!) 248 lb 6.4 oz (112.7 kg)      Body mass index is 30.24 kg/(m^2).  Wt Readings from Last 3 Encounters:   18 (!) 248 lb 6.4 oz (112.7 kg)   18 (!) 247 lb 12.8 oz (112.4 kg)   18 (!) 250 lb 12.8 oz (113.8 kg)     HEENT: Sclera are clear.   Lungs: Normal respirations, mild expiratory  Cardiac: Regular rate and rhythm  Abdomen: Soft and nondistended  Extremities: No edema  Skin: No rashes  Neuro: Moves all extremities and has facial symmetry  Gait: In a wheelchair    Labs:  All recent labs reviewed in the nursing home record and Epic.    Lab Results   Component Value Date     2018    K 4.8 2018     2018     CO2 24 02/19/2018    BUN 31 (H) 02/19/2018    CREATININE 1.00 02/19/2018    CALCIUM 8.8 02/19/2018     Lab Results   Component Value Date    WBC 14.8 (H) 02/05/2018    HGB 11.6 (L) 02/05/2018    HCT 36.5 (L) 02/05/2018    MCV 93 02/05/2018     02/05/2018     Assessment/Plan  1. Wheezing  We will treat him with some prednisone 40 mg a day for 5 days.    2. Other dysphagia  Continue with honey thickened liquids but add a plain water protocol between meals and medications.    3. Essential hypertension  Stable on medication.  Recheck a BMP and hemogram on the next blood draw today.    4. 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          Electronically signed by:   Yessenia Pineda MD  Internal and Geriatric medicine    Much or all of the text in this note was generated through the use of Dragon Dictate voice-to-text software. Errors in spelling or words which seem out of context are unintentional. Sound alike errors, in particular, may have escaped editing.

## 2021-06-18 NOTE — PROGRESS NOTES
[facility]    Facility:   [facility] [[id number]]   Code Status: DNR/DNI      CHIEF COMPLAINT/REASON FOR VISIT:  Chief Complaint   Patient presents with     Review Of Multiple Medical Conditions       HISTORY:      HPI: Neel is a 89 y.o. male  who sustained a pelvic fracture after a fall. It was decided by family to not undergo surgery and he was discharged From [facility] on Dec 5th, 2016  back to [facility] with hospice.   Overall he is doing remarkably well. His pain is minimal and he no longer requires the use of oxycodone, He was also  discharged from hospice care.    He does also have progressive dementia with advanced late onset Alzheimers.  He continues to work with restorative aid for ambulation.. He denies CP or SOB.  However he continues to have wheezing despite nebulizer. . I am told he was on honey thickened liquids back in  and returned to thin liquids for quality of life. He has agreed to return to thickened liquids. He is less congested.   His prior rash has resolved.  He continues to have an ulcer on the top of his right great toe in which he gets dressing changes done. He tells me it is getting smaller. He has a cough and tells me the sputum is clear. I have switched him to scheduled duo nebs.     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 behavioral disturbance 6/10/2010     Right bundle branch block              Family History   Problem Relation Age of Onset     Other Mother       of old age in her 80s     Heart attack Father       age 78     Heart attack Brother      Breast cancer Daughter      Social History     Social History     Marital status:      Spouse name: N/A     Number of children: N/A     Years of  education: N/A     Occupational History           retired     Social History Main Topics     Smoking status: Never Smoker     Smokeless tobacco: Never Used     Alcohol use No     Drug use: No     Sexual activity: No     Other Topics Concern     Not on file     Social History Narrative    He is from [place] and is a retired .  He was  in  and has one son alive, one daughter  of breast cancer. His niece is his main caretaker.         Review of Systems   Constitutional: Negative for appetite change, chills and fever.   HENT: Negative for congestion and sore throat.    Respiratory: Positive for cough and wheezing. Negative for shortness of breath.         Denied SOB but had expiratory wheezes  clear sputum- afebrile.    Cardiovascular: Positive for leg swelling. Negative for chest pain.   Gastrointestinal: Negative for abdominal distention, abdominal pain, constipation and diarrhea.   Genitourinary: Negative for dysuria.        Leg bag   Musculoskeletal: Positive for arthralgias.        Right hip   Skin:        Ulcer top of right great toe   Neurological: Negative for dizziness.   Psychiatric/Behavioral: Negative for sleep disturbance.       .  Vitals:    18 1507   BP: 125/68   Pulse: 69   Resp: 20   Temp: 97.6  F (36.4  C)   SpO2: 91%   Weight: (!) 248 lb (112.5 kg)       Physical Exam   Constitutional: He appears well-developed and well-nourished.   HENT:   Head: Normocephalic.   Eyes: Conjunctivae are normal.   Cardiovascular: Normal rate, regular rhythm and normal heart sounds.    No murmur heard.  Pulmonary/Chest: Effort normal. He has wheezes. He has no rales.   Throughout all fields.    Abdominal: Soft. Bowel sounds are normal. He exhibits no distension. There is no tenderness.   Genitourinary:   Genitourinary Comments: Flower with clear yellow urine.   Musculoskeletal: He exhibits edema.   1-2+bilateral t lower ext   Neurological: He is alert.   Skin:  Skin is warm and dry.   Rash has resolved.   Right great toe wound smaller per pt.   Psychiatric: He has a normal mood and affect. His behavior is normal.         LABS:   No results found for this or any previous visit (from the past 240 hour(s)).    Current Outpatient Prescriptions   Medication Sig     acetaminophen (TYLENOL) 500 MG tablet Take 1,000 mg by mouth daily.      acetaminophen (TYLENOL) 500 MG tablet Take 500-1,000 mg by mouth 2 (two) times a day as needed for pain. 500 mg for pain score of 1-5/10 and 1000 mg for pain score of 6-10/10. Ensure that 1000 mg is not exceeded in 4 hours, and do not exceed 3 grams in 24 hours.     albuterol (PROVENTIL) 2.5 mg /3 mL (0.083 %) nebulizer solution Take 2.5 mg by nebulization every 4 (four) hours as needed for wheezing.      aluminum-magnesium hydroxide-simethicone (MAALOX ADVANCED) 200-200-20 mg/5 mL Susp Take 30 mL by mouth every 2 (two) hours as needed.     ascorbic acid (VITAMIN C) 500 MG tablet Take 500 mg by mouth 2 (two) times a day.      bisacodyl (DULCOLAX) 10 mg suppository Insert 10 mg into the rectum as needed (constipation). Indications: Constipation     calamine lotion Apply 1 application topically 3 (three) times a day as needed. Apply to affected areas  Topically as needed for itching after the [facility]MC cream is used      camphor-menthol (SARNA) lotion Apply 1 application topically as needed for itching. Apply to abdomen and chest daily and PRN     carbamide peroxide (DEBROX) 6.5 % otic solution Administer 3 drops into both ears see administration instructions. Instill 3 drops in both ears as needed for ear wax build up twice daily X 3 days. Irrigate using 30 mL syringe and warm water.     cetirizine (ZYRTEC) 10 MG tablet Take 10 mg by mouth daily.     clobetasol (TEMOVATE) 0.05 % cream Apply 1 application topically 2 (two) times a day as needed.     dextromethorphan-guaiFENesin (ROBITUSSIN-DM)  mg/5 mL liquid Take 10 mL by mouth 2 (two) times a  day as needed (cough).      ferrous sulfate 65 mg elemental iron Take 1 tablet by mouth daily with breakfast. 325mg daily     fluticasone (FLONASE) 50 mcg/actuation nasal spray 1 spray into each nostril daily.      furosemide (LASIX) 20 MG tablet Take 20 mg by mouth daily.     gentamicin (GARAMYCIN) 0.1 % ointment Apply 1 application topically 3 (three) times a day.     hydrOXYzine (ATARAX) 25 MG tablet Take 25 mg by mouth at bedtime.     ipratropium-albuterol (DUO-NEB) 0.5-2.5 mg/3 mL nebulizer 3 mL 2 (two) times a day.     losartan (COZAAR) 50 MG tablet Take 50 mg by mouth daily.     magnesium hydroxide (MAGNESIUM HYDROXIDE) 400 mg/5 mL Susp suspension Take 30 mL by mouth daily as needed.     meloxicam (MOBIC) 7.5 MG tablet Take 1 tablet (7.5 mg total) by mouth daily.     senna-docusate (PERICOLACE) 8.6-50 mg tablet Take 1 tablet by mouth 2 (two) times a day. Do not administer if loose stools     whey protein isolate (BENEPROTEIN) 6 gram-25 kcal/7 gram PwPk Take 1 packet by mouth 2 (two) times a day.       ASSESSMENT:      ICD-10-CM    1. Other dysphagia R13.19    2. Essential hypertension I10    3. Primary osteoarthritis involving multiple joints M15.0    4. Neuropathy G62.9    5. Allergic dermatitis L23.9        PLAN:    SOB observed but pt denied when asked,  wheezes throughout all fields.   scheduled nebs, speech evaluation change albuterol to duonebs.  Rash resolved  Pt no longer on oxycodone-pain tolerable  with current medications.  Physical therapy currently working with [facility]ative care  Otherwise resident is doing well and has no other concerns.   Lower ext edema- Elevate legs while up in the chair.Compression stockings on during the day and off at night. On Lasix    Electronically signed by: Mckenna Douglass CNP

## 2021-06-18 NOTE — LETTER
Letter by Barbara Bolaños CNP at      Author: Barbara Bolaños CNP Service: -- Author Type: --    Filed:  Encounter Date: 2019 Status: (Other)         Patient: Neel Navarro   MR Number: [id number]   YOB: 1920   Date of Visit: 2019          [facility]    NAME:  Neel Navarro             :  10/12/1920  MRN: [id number]  CODE STATUS:  DNR/DNI    FACILITY:  [facility]  [[id number]]       ROOM:   323    CHIEF COMPLAINT/REASON FOR VISIT:  Chief Complaint   Patient presents with   ? Review Of Multiple Medical Conditions     HISTORY OF PRESENT ILLNESS: Neel Navarro is a 89 y.o. male with CAD, Late onset Alzheimer's disease, HLD, and Dysphagia is being seen today in the long term care facility for follow up and management of multiple chronic medical conditions.      Today, patient is seen at the bedside.  Patient has a recurrent rash on upper chest, abdomen, and arms with erythema and pruritis.    He has PVD and has chronic wounds that mostly likely never heal.  1. Left 2nd toe chronic wound that had some necrotic tissue fall off during a dressing change.  The residual wound measures 1.3 cm x 0.7 cm.     2. Area between left great toe has pale moist open area measuring 0.5 cm x 0.5 cm.    3. Left foot bunion measures 2.0 cm x 2.0 cm.  The center of bunion measures 1.2 cm x 1.2 cm.   4.  Right foot great toe wound is stable and measures 1.8 cm x 1.5 cm  Wounds on both feet are malodorous.  Ray denies any pain but does report some discomfort with the dressing changes.  Currently with weekly dressing changes.    Past Medical History:   Diagnosis Date   ? Allergic dermatitis 2017   ? Angina pectoris (H) 2003   ? Benign neoplasm of colon 2006    Overview:  Adenoma  Declines colonoscopy at 89 years old   ? Bowel obstruction (H)    ? CAD (coronary artery disease)    ? Carotid artery narrowing  3/1/2010   ? Closed nondisplaced fracture of acetabulum with routine healing 2016   ? Dementia    ? Dermatitis 2/3/2018   ? Dysphagia    ? Enlarged prostate    ? Essential hypertension    ? Fall 2016   ? Gastrointestinal bleeding, upper 2007    Overview:  UGI bleed 2nd to esophagitis +/- Esophageal angioectasia   ? Gout    ? HLD (hyperlipidemia)    ? Late onset Alzheimer's disease without behavioral disturbance 6/10/2010   ? Leg weakness 2011   ? Neuropathy (H) 2010   ? OP (osteoporosis) 2007    Overview:  Osteopenia on DEXA   R Hip Fx    ? Other dysphagia 2018   ? Peripheral blood vessel disorder (H) 3/17/2004    Overview:  Epic    ? Primary osteoarthritis involving multiple joints 2004    Overview:  Epic    ? Right bundle branch block    ? Urinary retention 2/3/2018    Has a chronic peña   ? Vitamin D deficiency 3/1/2010   ? Wheezing 3/21/2018     Past Surgical History:   Procedure Laterality Date   ? REPLACEMENT TOTAL KNEE BILATERAL       Family History   Problem Relation Age of Onset   ? Other Mother          of old age in her 80s   ? Heart attack Father          age 78   ? Heart attack Brother    ? Breast cancer Daughter      Social History     Socioeconomic History   ? Marital status:      Spouse name: Not on file   ? Number of children: Not on file   ? Years of education: Not on file   ? Highest education level: Not on file   Social Needs   ? Financial resource strain: Not on file   ? Food insecurity - worry: Not on file   ? Food insecurity - inability: Not on file   ? Transportation needs - medical: Not on file   ? Transportation needs - non-medical: Not on file   Occupational History   ? Occupation:      Comment: retired   Tobacco Use   ? Smoking status: Former Smoker   ? Smokeless tobacco: Never Used   Substance and Sexual Activity   ? Alcohol use: No   ? Drug use: No   ? Sexual activity: No   Other Topics Concern   ? Not on  file   Social History Narrative    He is from [place] and is a retired .  He was  in 1962 and has one son alive, one daughter  of breast cancer. His niece is his main caretaker.     Allergies   Allergen Reactions   ? Ranitidine Unknown     Current Outpatient Medications   Medication Sig Dispense Refill   ? acetaminophen (TYLENOL) 500 MG tablet Take 1,000 mg by mouth every 6 (six) hours as needed for pain Not to exceed 3 grams in 24 hours.           ? albuterol (PROVENTIL) 2.5 mg /3 mL (0.083 %) nebulizer solution Take 2.5 mg by nebulization every 4 (four) hours as needed for wheezing.      ? allopurinol (ZYLOPRIM) 100 MG tablet Take 100 mg by mouth daily.     ? aluminum-magnesium hydroxide-simethicone (MAALOX ADVANCED) 200-200-20 mg/5 mL Susp Take 30 mL by mouth every 2 (two) hours as needed.     ? ascorbic acid (VITAMIN C) 500 MG tablet Take 500 mg by mouth 2 (two) times a day.      ? bisacodyl (DULCOLAX) 10 mg suppository Insert 10 mg into the rectum daily as needed (constipation).      ? calamine lotion Apply 1 application topically 3 (three) times a day as needed. Apply to affected areas  Topically as needed for itching after the TMC cream is used      ? camphor-menthol (SARNA) lotion Apply 1 application topically as needed for itching. Apply to abdomen and chest daily and PRN     ? carbamide peroxide (DEBROX) 6.5 % otic solution Administer 3 drops into both ears see administration instructions. Instill 3 drops in both ears as needed for ear wax build up twice daily X 3 days. Irrigate using 30 mL syringe and warm water.     ? cetirizine (ZYRTEC) 10 MG tablet Take 10 mg by mouth daily.     ? clobetasol (TEMOVATE) 0.05 % cream Apply 1 application topically 2 (two) times a day as needed.     ? ferrous sulfate 65 mg elemental iron Take 1 tablet by mouth 2 (two) times a day with meals. 325mg daily           ? fluticasone (FLONASE) 50 mcg/actuation nasal spray 1 spray into each  nostril daily.      ? furosemide (LASIX) 20 MG tablet Take 40 mg by mouth daily.      ? guaiFENesin (ROBITUSSIN) 100 mg/5 mL syrup Take 200 mg by mouth 4 (four) times a day.     ? hydrOXYzine (ATARAX) 25 MG tablet Take 25 mg by mouth at bedtime.     ? insulin aspart U-100 (NOVOLOG) 100 unit/mL injection Inject under the skin 3 (three) times a day before meals 200-299= 4 units  300-399= 6 units  400-499= 10 units  500-999= 12 units.           ? insulin aspart U-100 (NOVOLOG) 100 unit/mL injection Inject 10 Units under the skin 3 (three) times a day before meals.     ? insulin glargine (BASAGLAR KWIKPEN) 100 unit/mL (3 mL) pen Inject 12 Units under the skin at bedtime.     ? ipratropium-albuterol (DUO-NEB) 0.5-2.5 mg/3 mL nebulizer Inhale 3 mL 2 (two) times a day.      ? magnesium hydroxide (MAGNESIUM HYDROXIDE) 400 mg/5 mL Susp suspension Take 30 mL by mouth 2 (two) times a day as needed.      ? meloxicam (MOBIC) 7.5 MG tablet Take 1 tablet (7.5 mg total) by mouth daily. 90 tablet 0   ? omeprazole (PRILOSEC) 20 MG capsule Take 20 mg by mouth daily.     ? ondansetron (ZOFRAN) 4 MG tablet Take 4 mg by mouth every 6 (six) hours as needed for nausea.     ? protein supplement Pack Take 1 packet by mouth 2 (two) times a day.     ? senna-docusate (PERICOLACE) 8.6-50 mg tablet Take 1 tablet by mouth 2 (two) times a day. Do not administer if loose stools (Patient taking differently: Take 1 tablet by mouth daily. Do not administer if loose stools      )  0   ? spironolactone (ALDACTONE) 25 MG tablet Take 25 mg by mouth daily.       No current facility-administered medications for this visit.        REVIEW OF SYSTEMS:    Currently, no fever, chills, or rigors. Does not have any visual problems.  Hard of hearing.  Denies any chest pain, headaches, palpitations, lightheadedness, dizziness, shortness of breath, or cough. Appetite is good. Denies any GERD symptoms. Denies any difficulty with nausea or vomiting. Dysphagia. Denies  any abdominal pain, diarrhea, or constipation. Urinary retention. No insomnia. No active bleeding. Rash on upper chest, abdomen, and bilateral arms.      PHYSICAL EXAMINATION:  Vitals:    01/28/19 1056   BP: 111/60   Pulse: 96   Resp: 20   Temp: 97.6  F (36.4  C)   SpO2: 96%   Weight: (!) 234 lb 12.8 oz (106.5 kg)       GENERAL: Awake, Alert, oriented x3, not in any form of acute distress, answers questions appropriately, follows simple commands, conversant  HEENT: Head is normocephalic with normal hair distribution. No evidence of trauma. Ears: No acute purulent discharge. Eyes: Conjunctivae pink with no scleral jaundice. Nose: Normal mucosa and septum. NECK: Supple with no cervical or supraclavicular lymphadenopathy. Trachea is midline.   CHEST: No tenderness or deformity, no crepitus  LUNG: Clear to auscultation with good chest expansion. There are no crackles or wheezes, normal AP diameter.  BACK: No kyphosis of the thoracic spine. Symmetric, no curvature, ROM normal, no CVA tenderness, no spinal tenderness   CVS: There is good S1  S2, there are no murmurs, rubs, gallops, or heaves, rhythm is regular,  2+ pulses symmetric in all extremities.  ABDOMEN: Globular and soft, nontender to palpation, non distended, no masses, no organomegaly, good bowel sounds, no rebound or guarding, no peritoneal signs.   EXTREMITIES:  Decreased range of motion on both upper and lower extremities, there is no tenderness to palpation, pedal edema, no cyanosis or clubbing, no calf tenderness.  Pulses decreased in lower extremities, delayed cap refill, no joint swelling.  Right/left great toe, left foot bunion, 2nd left toe, area between left great toe and 2nd toe, with open areas.  SKIN: Upper chest, abdomen, and arm rash with erythema an pruritis  NEUROLOGICAL: The patient is oriented to person and place. Strength and sensation are grossly intact. Face is symmetric.    LABS:    Lab Results   Component Value Date    WBC 7.0 07/05/2018     HGB 12.6 (L) 08/09/2018    HCT 37.3 (L) 07/05/2018    MCV 91 07/05/2018     07/05/2018     Results for orders placed or performed in visit on 07/05/18   Basic Metabolic Panel   Result Value Ref Range    Sodium 139 136 - 145 mmol/L    Potassium 4.5 3.5 - 5.0 mmol/L    Chloride 106 98 - 107 mmol/L    CO2 27 22 - 31 mmol/L    Anion Gap, Calculation 6 5 - 18 mmol/L    Glucose 147 (H) 70 - 125 mg/dL    Calcium 9.0 8.5 - 10.5 mg/dL    BUN 30 (H) 8 - 28 mg/dL    Creatinine 1.19 0.70 - 1.30 mg/dL    GFR MDRD Af Amer >60 >60 mL/min/1.73m2    GFR MDRD Non Af Amer 57 (L) >60 mL/min/1.73m2     ASSESSMENT/PLAN:    1. PVD (peripheral vascular disease) (H) - With chronic nonhealing wound. Followed by the vascular clinic as needed.     2. Multiple open wounds of foot - Ongoing, increase dressing changes to twice coffman and continue current treatment plan.   3. Dermatitis - Start Clobetasol 0.05% BID until healed then change to prn. Decrease Vistaril to 10 mg daily   4. Anemia, unspecified type - Continue Ferrous sulfate daily   5. Essential hypertension - Blood pressures are within target range, will continue Losartan and Lasix                   CARE PLAN: The care plan has been reviewed and discussed with patient and nursing staff. No changes made at this time except those noted above.  We will continue to monitor.        Electronically signed by:  Barbara Bolaños CNP

## 2021-06-18 NOTE — LETTER
Letter by Melina Mackenzie MD at      Author: Melina Mackenzie MD Service: -- Author Type: --    Filed:  Encounter Date: 2018 Status: (Other)         Patient: Neel Navarro   MR Number: [id number]   YOB: 1920   Date of Visit: 2018           [facility]    NAME: Neel Navarro  : 10/12/1920           MR# [id number]           Code Status:  DNR  Visit Type: Review Of Multiple Medical Conditions     Facility:  [facility]  [[id number]]           Chief Complaint   Patient presents with   ? Review Of Multiple Medical Conditions       History of Present Illness: Neel is a 89 y.o. male having at [facility].  He does have Alzheimer's dementia, and the staff reports that he is not complying with a thickened liquids as ordered by speech therapy.  He has had episodes of choking on pills.  In the past he has had pneumonia, thought to be suspicious for silent aspiration.  Was question whether or not speech-language pathology should evaluate him again.  However I would wonder if he is choosing to be noncompliant and/or his cognitive abilities are such that he would even benefit from speech-language pathology.  Certainly no harm in having them evaluate    As well as followed by the vascular clinic for chronic wounds, and staff had stated it was likely they would not heal.  Wounds are being dressed and observed by nursing staff.    Patient Active Problem List   Diagnosis   ? Angina pectoris (H)   ? Carotid artery narrowing   ? Benign neoplasm of colon   ? Late onset Alzheimer's disease without behavioral disturbance   ? Enlarged prostate   ? Gout   ? Leg weakness   ? Neuropathy (H)   ? Primary osteoarthritis involving multiple joints   ? OP (osteoporosis)   ? Peripheral blood vessel disorder (H)   ? Vitamin D deficiency   ? Essential hypertension   ? Closed nondisplaced fracture of acetabulum with routine healing   ? Fall   ?  Allergic dermatitis   ? Dermatitis   ? Urinary retention   ? Gastrointestinal bleeding, upper   ? Wheezing   ? Other dysphagia       Current Medications:  Current Outpatient Medications   Medication Sig   ? acetaminophen (TYLENOL) 500 MG tablet Take 1,000 mg by mouth every 6 (six) hours as needed for pain Not to exceed 3 grams in 24 hours.         ? albuterol (PROVENTIL) 2.5 mg /3 mL (0.083 %) nebulizer solution Take 2.5 mg by nebulization every 4 (four) hours as needed for wheezing.    ? allopurinol (ZYLOPRIM) 100 MG tablet Take 100 mg by mouth daily.   ? aluminum-magnesium hydroxide-simethicone (MAALOX ADVANCED) 200-200-20 mg/5 mL Susp Take 30 mL by mouth every 2 (two) hours as needed.   ? ascorbic acid (VITAMIN C) 500 MG tablet Take 500 mg by mouth 2 (two) times a day.    ? bisacodyl (DULCOLAX) 10 mg suppository Insert 10 mg into the rectum daily as needed (constipation).    ? calamine lotion Apply 1 application topically 3 (three) times a day as needed. Apply to affected areas  Topically as needed for itching after the TMC cream is used    ? camphor-menthol (SARNA) lotion Apply 1 application topically as needed for itching. Apply to abdomen and chest daily and PRN   ? carbamide peroxide (DEBROX) 6.5 % otic solution Administer 3 drops into both ears see administration instructions. Instill 3 drops in both ears as needed for ear wax build up twice daily X 3 days. Irrigate using 30 mL syringe and warm water.   ? cetirizine (ZYRTEC) 10 MG tablet Take 10 mg by mouth daily.   ? clobetasol (TEMOVATE) 0.05 % cream Apply 1 application topically 2 (two) times a day as needed.   ? ferrous sulfate 65 mg elemental iron Take 1 tablet by mouth daily with breakfast. 325mg daily   ? fluticasone (FLONASE) 50 mcg/actuation nasal spray 1 spray into each nostril daily.    ? furosemide (LASIX) 20 MG tablet Take 40 mg by mouth daily.    ? guaiFENesin ER (MUCINEX) 600 mg 12 hr tablet Take 600 mg by mouth 2 (two) times a day .         ?  hydrOXYzine (ATARAX) 25 MG tablet Take 25 mg by mouth at bedtime.   ? ipratropium-albuterol (DUO-NEB) 0.5-2.5 mg/3 mL nebulizer Inhale 3 mL 2 (two) times a day.    ? magnesium hydroxide (MAGNESIUM HYDROXIDE) 400 mg/5 mL Susp suspension Take 30 mL by mouth 2 (two) times a day as needed.    ? meloxicam (MOBIC) 7.5 MG tablet Take 1 tablet (7.5 mg total) by mouth daily.   ? omeprazole (PRILOSEC) 20 MG capsule Take 20 mg by mouth daily.   ? ondansetron (ZOFRAN) 4 MG tablet Take 4 mg by mouth every 6 (six) hours as needed for nausea.   ? protein supplement Pack Take 1 packet by mouth 2 (two) times a day.   ? senna-docusate (PERICOLACE) 8.6-50 mg tablet Take 1 tablet by mouth 2 (two) times a day. Do not administer if loose stools       ROS he states his feet do not really bother him.  He does not think he has trouble swallowing, is not aware of choking when he swallows or coughing      Social History     Social History Narrative    He is from [place] and is a retired .  He was  in 2 and has one son alive, one daughter  of breast cancer. His niece is his main caretaker.         Blood pressure 104/61, pulse 89, temperature 98.4  F (36.9  C), resp. rate 19, SpO2 91 %.        Exam  General Appearance: Pleasant, tells jokes ambulates himself in a wheelchair  Very moist vocal quality  Lungs: Soft inspiratory crackles bilaterally   Abdomen: Soft and non tender to palpation  Musculoskeletal :right great toe looks good, the left first and second toe have continuous sores minimal surrounding erythema has teds on both legs, edema  Neuro: Moves all extremities and has facial symmetry, uses his legs to mobilize himself in a wheelchair  Mood: Pleasant    Labs:     Ref Range & Units 18    Hemoglobin 14.0 - 18.0 g/dL 12.6 Abnormally low        Lab Results   Component Value Date     2018    K 4.5 2018     2018    CO2 27 2018    BUN 30 (H) 2018     "CREATININE 1.19 07/05/2018    CALCIUM 9.0 07/05/2018     Lab Results   Component Value Date    WBC 7.0 07/05/2018    HGB 12.6 (L) 08/09/2018    HCT 37.3 (L) 07/05/2018    MCV 91 07/05/2018     07/05/2018     Lab Results   Component Value Date    TSH 4.40 04/12/2018     ASSESSMENT / PLAN:    ICD-10-CM    1. PVD (peripheral vascular disease) (H) I73.9  continue wound cares   2. Multiple open wounds of foot S91.309A    \"   3. Essential hypertension I10  under good control   4. Neuropathy (H) G62.9    5. Other dysphagia R13.19  I am not sure if he would benefit cognitively from speech-language pathology but no harm in having him evaluated   6. Primary osteoarthritis involving multiple joints M15.0  Mobic acetaminophen.  No signs of gout but he is on allopurinol maintenance therapy   7. Late onset Alzheimer's disease without behavioral disturbance G30.1  doing well from 89-year-old,    F02.80            Electronically signed by:   Melina Mackenzie MD             "

## 2021-06-18 NOTE — PROGRESS NOTES
[facility] History and Physical    Code Status:  DNR/DNI    Visit Type: Review Of Multiple Medical Conditions and H & P     Facility:  [facility] [[id number]]         Facility Type:  (Long Term Care, LTC)    History of Present Illness: Neel Navarro is a 89 y.o. male who was hospitalized last month at [facility] for a partial small bowel obstruction/ileus.  He was treated conservatively and discharged [facility] within a few days.  He has had no recurrent problems of nausea or vomiting or abdominal pain.  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  He has been wheezing off and on and they suspect he is chronically aspirating.  He does have thickened liquids.  Weight is up a few pounds    Past Medical History:   Diagnosis Date     Bowel obstruction      CAD (coronary artery disease)      Dementia      Dysphagia      Enlarged prostate      Essential hypertension      Gout      HLD (hyperlipidemia)      Late onset Alzheimer's disease without behavioral disturbance 6/10/2010     Right bundle branch block      No past surgical history on file.  Family History   Problem Relation Age of Onset     Other Mother       of old age in her 80s     Heart attack Father       age 78     Heart attack Brother      Breast cancer Daughter      Social History     Social History Narrative    He is from [place] and is a retired .  He was  in 1962 and has one son alive, one daughter  of breast cancer. His niece is his main caretaker.     Current Outpatient Prescriptions   Medication Sig Dispense Refill     acetaminophen (TYLENOL) 500 MG tablet Take 1,000 mg by mouth every 6 (six) hours as needed for pain.        albuterol (PROVENTIL) 2.5 mg /3 mL (0.083 %) nebulizer solution Take 2.5 mg by  nebulization every 4 (four) hours as needed for wheezing.        ascorbic acid (VITAMIN C) 500 MG tablet Take 500 mg by mouth 2 (two) times a day.        bisacodyl (DULCOLAX) 10 mg suppository Insert 10 mg into the rectum daily as needed (constipation).        calamine lotion Apply 1 application topically 3 (three) times a day as needed. Apply to affected areas  Topically as needed for itching after the TMC cream is used        camphor-menthol (SARNA) lotion Apply 1 application topically as needed for itching. Apply to abdomen and chest daily and PRN       carbamide peroxide (DEBROX) 6.5 % otic solution Administer 3 drops into both ears see administration instructions. Instill 3 drops in both ears as needed for ear wax build up twice daily X 3 days. Irrigate using 30 mL syringe and warm water.       cetirizine (ZYRTEC) 10 MG tablet Take 10 mg by mouth daily.       clobetasol (TEMOVATE) 0.05 % cream Apply 1 application topically 2 (two) times a day as needed.       ferrous sulfate 65 mg elemental iron Take 1 tablet by mouth daily with breakfast. 325mg daily       fluticasone (FLONASE) 50 mcg/actuation nasal spray 1 spray into each nostril daily.        furosemide (LASIX) 20 MG tablet Take 20 mg by mouth daily.       hydrOXYzine (ATARAX) 25 MG tablet Take 25 mg by mouth at bedtime.       ipratropium-albuterol (DUO-NEB) 0.5-2.5 mg/3 mL nebulizer Inhale 3 mL 2 (two) times a day.        losartan (COZAAR) 50 MG tablet Take 50 mg by mouth daily.       magnesium hydroxide (MAGNESIUM HYDROXIDE) 400 mg/5 mL Susp suspension Take 30 mL by mouth 2 (two) times a day as needed.        meloxicam (MOBIC) 7.5 MG tablet Take 1 tablet (7.5 mg total) by mouth daily. 90 tablet 0     senna-docusate (PERICOLACE) 8.6-50 mg tablet Take 1 tablet by mouth 2 (two) times a day. Do not administer if loose stools  0     No current facility-administered medications for this visit.      Allergies   Allergen Reactions     Ranitidine Unknown       ROS  : A 12 point comprehensive review of systems was negative except as noted.    Physical exam:    General Appearance: Alert, in a wheelchair down in the [facility] area, seen at the nurses station.  Vitals:    06/07/18 2017   BP: 120/56   Pulse: 78   SpO2: 96%   Weight: (!) 252 lb (114.3 kg)      Body mass index is 30.67 kg/(m^2).  Wt Readings from Last 3 Encounters:   06/07/18 (!) 252 lb (114.3 kg)   05/16/18 (!) 248 lb (112.5 kg)   04/05/18 (!) 248 lb 6.4 oz (112.7 kg)     EYES: Eyelids, conjunctiva, and sclera were normal. Pupils were normal.   HEAD, EARS, NOSE, MOUTH, AND THROAT: Head is atraumatic, ears and canals are normal.  Nose mouth and throat are without lesions.  NECK: Neck appearance was normal. There were no neck masses and the thyroid was not enlarged.  RESPIRATORY: Normal respirations.  Lung sounds were equal bilaterally.  Minimal expiratory wheeze.  CARDIOVASCULAR: Heart rate and rhythm were normal.  S1 and S2 were normal and there were no extra sounds or murmurs. There was no peripheral edema.  GASTROINTESTINAL: The abdomen was soft and nondistended.     MUSCULOSKELETAL: Skeletal configuration was normal and muscle mass was normal for age. Joint appearance was overall normal.  LYMPHATIC: There were no enlarged nodes palpable.  SKIN/HAIR/NAILS: Skin color was normal.  No rashes.  Gouty tophi with ulcerations on hammer toes.  NEUROLOGIC: The patient was alert and oriented.  Speech was normal.  There is no facial asymmetry.   PSYCHIATRIC:  Mood and affect were normal.     Labs:  All labs reviewed in the nursing home record and in epic.    Assessment and plan:  1. Ileus of unspecified type  Resolved.  He was treated conservatively at [facility] with no signs of recurrence.    2. Late onset Alzheimer's disease without behavioral disturbance  As with the poor memory, no behavior disturbances.  Continue safe and comfortable environment.    3. 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    4. Wheezing  Element of heart failure.  Will not initiate an echocardiogram but will check a BNP level next labs.  We will start him on furosemide 20 mg daily and spironolactone 25 mg daily and check a BMP and hemogram in 3 weeks.    5. Skin ulcer of toe, limited to breakdown of skin, unspecified laterality  Try some medi honey dressing to the area.    6. Gout  We will start him on allopurinol 100 mg daily and check a uric acid level in 3 weeks.  See if we can dissolve some of the gouty tophi therefore improving the toe ulcerations.    Electronically signed by: Yessenia Pineda MD   Internal and Geriatric Medicine

## 2021-06-19 NOTE — PROGRESS NOTES
[facility]    DATE: 2018    NAME:  Neel Navarro             :  10/12/1920  MRN: [id number]  CODE STATUS:  DNR/DNI    FACILITY:  [facility]  [[id number]]       ROOM:   323    CHIEF COMPLAINT/REASON FOR VISIT:  Chief Complaint   Patient presents with     Review Of Multiple Medical Conditions     HISTORY OF PRESENT ILLNESS: Neel Navarro is a 89 y.o. male with CAD, Late onset Alzheimer's disease, HLD, and Dysphagia is being seen today in the long term care facility for follow up and management of multiple chronic medical conditions.  He has a right great toe infection which is presently being treated with Doxycycline.  This wound is chronic.  He was first seen for this back in 2017.  He has a chronic rash on his upper torso area.  The site is pruritic with erythema.  There is an open area on the knuckle coupled with edema. He is on thicken liquids for silent aspiration.    Past Medical History:   Diagnosis Date     Bowel obstruction      CAD (coronary artery disease)      Dementia      Dysphagia      Enlarged prostate      Essential hypertension      Gout      HLD (hyperlipidemia)      Late onset Alzheimer's disease without behavioral disturbance 6/10/2010     Right bundle branch block      No past surgical history on file.  Family History   Problem Relation Age of Onset     Other Mother       of old age in her 80s     Heart attack Father       age 78     Heart attack Brother      Breast cancer Daughter      Social History     Social History     Marital status:      Spouse name: N/A     Number of children: N/A     Years of education: N/A     Occupational History           retired     Social History Main Topics     Smoking status: Never Smoker     Smokeless tobacco: Never Used     Alcohol use No     Drug use: No     Sexual activity: No     Other Topics Concern     Not on file     Social History Narrative    He is  from [place] and is a retired .  He was  in 2 and has one son alive, one daughter  of breast cancer. His niece is his main caretaker.     Allergies   Allergen Reactions     Ranitidine Unknown     Current Outpatient Prescriptions   Medication Sig Dispense Refill     acetaminophen (TYLENOL) 500 MG tablet Take 1,000 mg by mouth 3 (three) times a day as needed for pain. Not to exceed 3 grams in 24 hours       albuterol (PROVENTIL) 2.5 mg /3 mL (0.083 %) nebulizer solution Take 2.5 mg by nebulization every 4 (four) hours as needed for wheezing.        allopurinol (ZYLOPRIM) 100 MG tablet Take 100 mg by mouth daily.       aluminum-magnesium hydroxide-simethicone (MAALOX ADVANCED) 200-200-20 mg/5 mL Susp Take 30 mL by mouth every 2 (two) hours as needed.       ascorbic acid (VITAMIN C) 500 MG tablet Take 500 mg by mouth 2 (two) times a day.        bisacodyl (DULCOLAX) 10 mg suppository Insert 10 mg into the rectum daily as needed (constipation).        calamine lotion Apply 1 application topically 3 (three) times a day as needed. Apply to affected areas  Topically as needed for itching after the [facility]MC cream is used        camphor-menthol (SARNA) lotion Apply 1 application topically as needed for itching. Apply to abdomen and chest daily and PRN       carbamide peroxide (DEBROX) 6.5 % otic solution Administer 3 drops into both ears see administration instructions. Instill 3 drops in both ears as needed for ear wax build up twice daily X 3 days. Irrigate using 30 mL syringe and warm water.       cetirizine (ZYRTEC) 10 MG tablet Take 10 mg by mouth daily.       clobetasol (TEMOVATE) 0.05 % cream Apply 1 application topically 2 (two) times a day as needed.       doxycycline (DORYX) 100 MG EC tablet Take 100 mg by mouth 2 (two) times a day.       ferrous sulfate 65 mg elemental iron Take 1 tablet by mouth daily with breakfast. 325mg daily       fluticasone (FLONASE) 50 mcg/actuation nasal  spray 1 spray into each nostril daily.        furosemide (LASIX) 20 MG tablet Take 40 mg by mouth daily.        guaiFENesin ER (MUCINEX) 600 mg 12 hr tablet Take 1,200 mg by mouth 2 (two) times a day.       hydrOXYzine (ATARAX) 25 MG tablet Take 25 mg by mouth at bedtime.       ipratropium-albuterol (DUO-NEB) 0.5-2.5 mg/3 mL nebulizer Inhale 3 mL 2 (two) times a day.        losartan (COZAAR) 50 MG tablet Take 50 mg by mouth daily.       magnesium hydroxide (MAGNESIUM HYDROXIDE) 400 mg/5 mL Susp suspension Take 30 mL by mouth 2 (two) times a day as needed.        meloxicam (MOBIC) 7.5 MG tablet Take 1 tablet (7.5 mg total) by mouth daily. 90 tablet 0     protein supplement Pack Take 1 packet by mouth 2 (two) times a day.       senna-docusate (PERICOLACE) 8.6-50 mg tablet Take 1 tablet by mouth 2 (two) times a day. Do not administer if loose stools  0     No current facility-administered medications for this visit.        REVIEW OF SYSTEMS:    Currently, no fever, chills, or rigors. Does not have any visual or hearing problems. Denies any chest pain, headaches, palpitations, lightheadedness, dizziness, shortness of breath, or cough. Appetite is good. Denies any GERD symptoms. Denies any difficulty with swallowing, nausea, or vomiting.  Denies any abdominal pain, diarrhea or constipation. Denies any urinary symptoms. No insomnia. No active bleeding. No rash.       PHYSICAL EXAMINATION:  Vitals:    07/27/18 0743   BP: 111/57   Pulse: 84   Resp: 18   Temp: 98.8  F (37.1  C)   SpO2: 93%   Weight: (!) 248 lb 12.8 oz (112.9 kg)       GENERAL: Awake, Alert, oriented x3, not in any form of acute distress, answers questions appropriately, follows simple commands, conversant  HEENT: Head is normocephalic with normal hair distribution. No evidence of trauma. Ears: No acute purulent discharge. Eyes: Conjunctivae pink with no scleral jaundice. Nose: Normal mucosa and septum. NECK: Supple with no cervical or supraclavicular  lymphadenopathy. Trachea is midline.   CHEST: No tenderness or deformity, no crepitus  LUNG: Clear to auscultation with good chest expansion. There are no crackles or wheezes, normal AP diameter.  BACK: No kyphosis of the thoracic spine. Symmetric, no curvature, ROM normal, no CVA tenderness, no spinal tenderness   CVS: There is good S1  S2, there are no murmurs, rubs, gallops, or heaves, rhythm is regular,  2+ pulses symmetric in all extremities.  ABDOMEN: Globular and soft, nontender to palpation, non distended, no masses, no organomegaly, good bowel sounds, no rebound or guarding, no peritoneal signs.   EXTREMITIES:  Full range of motion on both upper and lower extremities, there is no tenderness to palpation, no pedal edema, no cyanosis or clubbing, no calf tenderness.  Pulses equal in all extremities, normal cap refill, no joint swelling.Right great toe with erythema and open area on the knuckle with edema.  SKIN: Warm and dry, no erythema noted.  Skin color, texture, no rashes or lesions.  NEUROLOGICAL: The patient is oriented to person, place and time. Strength and sensation are grossly intact. Face is symmetric.    LABS:    Lab Results   Component Value Date    WBC 7.0 07/05/2018    HGB 12.1 (L) 07/05/2018    HCT 37.3 (L) 07/05/2018    MCV 91 07/05/2018     07/05/2018     Results for orders placed or performed in visit on 07/05/18   Basic Metabolic Panel   Result Value Ref Range    Sodium 139 136 - 145 mmol/L    Potassium 4.5 3.5 - 5.0 mmol/L    Chloride 106 98 - 107 mmol/L    CO2 27 22 - 31 mmol/L    Anion Gap, Calculation 6 5 - 18 mmol/L    Glucose 147 (H) 70 - 125 mg/dL    Calcium 9.0 8.5 - 10.5 mg/dL    BUN 30 (H) 8 - 28 mg/dL    Creatinine 1.19 0.70 - 1.30 mg/dL    GFR MDRD Af Amer >60 >60 mL/min/1.73m2    GFR MDRD Non Af Amer 57 (L) >60 mL/min/1.73m2     ASSESSMENT/PLAN:    1. Inflammation of interphalangeal joint of right toe due to infection (H) - Continue Doxycycline and current wound  dressing treatment   2. Skin ulcer of toe of right foot, limited to breakdown of skin (H) - Will have wound nurse to evaluate and treat   3. Anemia - Last Hgb 12.1, increase Ferrous sulfate to 325 mg two times a day and check Hgb in 2 weeks   4. Essential hypertension - Blood pressures are within target range, will continue Losartan and Lasix   5. Other dysphagia - Continue thicken liquids   6. Rash - Worsening, will start Clobetasol cream 0.05% to upper chest and arms two times a day until resolved than change to prn         Electronically signed by:  Barbara Bolaños CNP    35 minutes TT of which 50% was spent in counseling and coordination of care of the above plan.    Time spent in interview and examination of patient, review of available records, and discussion with nursing staff. Continue care plan, efforts at therapy, and monitor nutritional status.

## 2021-06-19 NOTE — LETTER
Letter by Barbara Bolaños CNP at      Author: Barbara Bolaños CNP Service: -- Author Type: --    Filed:  Encounter Date: 3/20/2019 Status: (Other)         Patient: Neel Navarro   MR Number: [id number]   YOB: 1920   Date of Visit: 3/20/2019          [facility]    NAME:  Neel Navarro             :  10/12/1920  MRN: [id number]  CODE STATUS:  DNR/DNI    FACILITY:  [facility]  [[id number]]       ROOM:   323    CHIEF COMPLAINT/REASON FOR VISIT:  Chief Complaint   Patient presents with   ? Review Of Multiple Medical Conditions     HISTORY OF PRESENT ILLNESS: Neel Navarro is a 89 y.o. male with CAD, Late onset Alzheimer's disease, HLD, and Dysphagia is being seen today in the long term care facility for follow up and management of multiple chronic medical conditions.      Today, patient is seen at the bedside.  Patient has a h/o recurrent rash on upper chest, abdomen, and arms with erythema and pruritis. He has a flare up today that is on his abdomen and neck.     He has PVD and has chronic wounds that are looking better since controlling his blood sugars.  Currently on Vistaril at bedtime.  No pain.    Past Medical History:   Diagnosis Date   ? Allergic dermatitis 2017   ? Angina pectoris (H) 2003   ? Benign neoplasm of colon 2006    Overview:  Adenoma  Declines colonoscopy at 89 years old   ? Bowel obstruction (H)    ? CAD (coronary artery disease)    ? Carotid artery narrowing 3/1/2010   ? Closed nondisplaced fracture of acetabulum with routine healing 2016   ? Dementia    ? Dermatitis 2/3/2018   ? Dysphagia    ? Enlarged prostate    ? Essential hypertension    ? Fall 2016   ? Gastrointestinal bleeding, upper 2007    Overview:  UGI bleed 2nd to esophagitis +/- Esophageal angioectasia   ? Gout    ? HLD (hyperlipidemia)    ? Late onset Alzheimer's disease without behavioral  disturbance 6/10/2010   ? Leg weakness 2011   ? Neuropathy (H) 2010   ? OP (osteoporosis) 2007    Overview:  Osteopenia on DEXA   R Hip Fx    ? Other dysphagia 2018   ? Peripheral blood vessel disorder (H) 3/17/2004    Overview:  Epic    ? Primary osteoarthritis involving multiple joints 2004    Overview:  Epic    ? Right bundle branch block    ? Urinary retention 2/3/2018    Has a chronic peña   ? Vitamin D deficiency 3/1/2010   ? Wheezing 3/21/2018     Past Surgical History:   Procedure Laterality Date   ? REPLACEMENT TOTAL KNEE BILATERAL       Family History   Problem Relation Age of Onset   ? Other Mother          of old age in her 80s   ? Heart attack Father          age 78   ? Heart attack Brother    ? Breast cancer Daughter      Social History     Socioeconomic History   ? Marital status:      Spouse name: Not on file   ? Number of children: Not on file   ? Years of education: Not on file   ? Highest education level: Not on file   Occupational History   ? Occupation:      Comment: retired   Social Needs   ? Financial resource strain: Not on file   ? Food insecurity:     Worry: Not on file     Inability: Not on file   ? Transportation needs:     Medical: Not on file     Non-medical: Not on file   Tobacco Use   ? Smoking status: Former Smoker   ? Smokeless tobacco: Never Used   Substance and Sexual Activity   ? Alcohol use: No   ? Drug use: No   ? Sexual activity: No   Lifestyle   ? Physical activity:     Days per week: Not on file     Minutes per session: Not on file   ? Stress: Not on file   Relationships   ? Social connections:     Talks on phone: Not on file     Gets together: Not on file     Attends Congregational service: Not on file     Active member of club or organization: Not on file     Attends meetings of clubs or organizations: Not on file     Relationship status: Not on file   ? Intimate partner violence:     Fear of current or ex  partner: Not on file     Emotionally abused: Not on file     Physically abused: Not on file     Forced sexual activity: Not on file   Other Topics Concern   ? Not on file   Social History Narrative    He is from [place] and is a retired .  He was  in 2 and has one son alive, one daughter  of breast cancer. His niece is his main caretaker.     Allergies   Allergen Reactions   ? Ranitidine Unknown     Current Outpatient Medications   Medication Sig Dispense Refill   ? acetaminophen (TYLENOL) 500 MG tablet Take 1,000 mg by mouth every 6 (six) hours as needed for pain Not to exceed 3 grams in 24 hours.           ? albuterol (PROVENTIL) 2.5 mg /3 mL (0.083 %) nebulizer solution Take 2.5 mg by nebulization every 4 (four) hours as needed for wheezing.      ? allopurinol (ZYLOPRIM) 100 MG tablet Take 100 mg by mouth daily.     ? aluminum-magnesium hydroxide-simethicone (MAALOX ADVANCED) 200-200-20 mg/5 mL Susp Take 30 mL by mouth every 2 (two) hours as needed.     ? ascorbic acid (VITAMIN C) 500 MG tablet Take 500 mg by mouth 2 (two) times a day.      ? bisacodyl (DULCOLAX) 10 mg suppository Insert 10 mg into the rectum daily as needed (constipation).      ? calamine lotion Apply 1 application topically 3 (three) times a day as needed. Apply to affected areas  Topically as needed for itching after the TMC cream is used      ? camphor-menthol (SARNA) lotion Apply 1 application topically as needed for itching. Apply to abdomen and chest daily and PRN     ? carbamide peroxide (DEBROX) 6.5 % otic solution Administer 3 drops into both ears see administration instructions. Instill 3 drops in both ears as needed for ear wax build up twice daily X 3 days. Irrigate using 30 mL syringe and warm water.     ? cetirizine (ZYRTEC) 10 MG tablet Take 10 mg by mouth daily.     ? clobetasol (TEMOVATE) 0.05 % cream Apply 1 application topically 2 (two) times a day as needed.     ? ferrous sulfate 65 mg  elemental iron Take 1 tablet by mouth 2 (two) times a day with meals. 325mg daily           ? fluticasone (FLONASE) 50 mcg/actuation nasal spray 1 spray into each nostril daily.      ? furosemide (LASIX) 20 MG tablet Take 40 mg by mouth daily.      ? guaiFENesin (ROBITUSSIN) 100 mg/5 mL syrup Take 200 mg by mouth 4 (four) times a day.     ? hydrOXYzine (ATARAX) 25 MG tablet Take 25 mg by mouth at bedtime.     ? insulin aspart U-100 (NOVOLOG) 100 unit/mL injection Inject under the skin 3 (three) times a day before meals 200-299= 4 units  300-399= 6 units  400-499= 10 units  500-999= 12 units.           ? insulin aspart U-100 (NOVOLOG) 100 unit/mL injection Inject 10 Units under the skin 3 (three) times a day before meals.     ? insulin glargine (BASAGLAR KWIKPEN) 100 unit/mL (3 mL) pen Inject 12 Units under the skin at bedtime.     ? ipratropium-albuterol (DUO-NEB) 0.5-2.5 mg/3 mL nebulizer Inhale 3 mL 2 (two) times a day.      ? magnesium hydroxide (MAGNESIUM HYDROXIDE) 400 mg/5 mL Susp suspension Take 30 mL by mouth 2 (two) times a day as needed.      ? meloxicam (MOBIC) 7.5 MG tablet Take 1 tablet (7.5 mg total) by mouth daily. 90 tablet 0   ? omeprazole (PRILOSEC) 20 MG capsule Take 20 mg by mouth daily.     ? ondansetron (ZOFRAN) 4 MG tablet Take 4 mg by mouth every 6 (six) hours as needed for nausea.     ? protein supplement Pack Take 1 packet by mouth 2 (two) times a day.     ? senna-docusate (PERICOLACE) 8.6-50 mg tablet Take 1 tablet by mouth 2 (two) times a day. Do not administer if loose stools (Patient taking differently: Take 1 tablet by mouth daily. Do not administer if loose stools      )  0   ? spironolactone (ALDACTONE) 25 MG tablet Take 25 mg by mouth daily.       No current facility-administered medications for this visit.        REVIEW OF SYSTEMS:    Currently, no fever, chills, or rigors. Does not have any visual problems.  Hard of hearing.  Denies any chest pain, headaches, palpitations,  lightheadedness, dizziness, shortness of breath, or cough. Appetite is good. Denies any GERD symptoms. Denies any difficulty with nausea or vomiting. Dysphagia. Denies any abdominal pain, diarrhea, or constipation. Urinary retention. No insomnia. No active bleeding. Rash on upper chest, abdomen, and bilateral arms.      PHYSICAL EXAMINATION:  Vitals:    03/22/19 1029   BP: 116/70   Pulse: 75   Resp: 20   Temp: 98.1  F (36.7  C)   SpO2: 95%   Weight: (!) 234 lb 3.2 oz (106.2 kg)       GENERAL: Awake, Alert, oriented x3, not in any form of acute distress, answers questions appropriately, follows simple commands, conversant  HEENT: Head is normocephalic with normal hair distribution. No evidence of trauma. Ears: No acute purulent discharge. Eyes: Conjunctivae pink with no scleral jaundice. Nose: Normal mucosa and septum. NECK: Supple with no cervical or supraclavicular lymphadenopathy. Trachea is midline.   CHEST: No tenderness or deformity, no crepitus  LUNG: Clear to auscultation with good chest expansion. There are no crackles or wheezes, normal AP diameter.  BACK: No kyphosis of the thoracic spine. Symmetric, no curvature, ROM normal, no CVA tenderness, no spinal tenderness   CVS: There is good S1  S2, there are no murmurs, rubs, gallops, or heaves, rhythm is regular,  2+ pulses symmetric in all extremities.  ABDOMEN: Globular and soft, nontender to palpation, non distended, no masses, no organomegaly, good bowel sounds, no rebound or guarding, no peritoneal signs.   EXTREMITIES:  Decreased range of motion on both upper and lower extremities, there is no tenderness to palpation, pedal edema, no cyanosis or clubbing, no calf tenderness.  Pulses decreased in lower extremities, delayed cap refill, no joint swelling.  Right/left great toe, left foot bunion, 2nd left toe, area between left great toe and 2nd toe, with open areas.  SKIN: Upper chest, abdomen, and arm rash with erythema an pruritis  NEUROLOGICAL: The  patient is oriented to person and place. Strength and sensation are grossly intact. Face is symmetric.    LABS:    Lab Results   Component Value Date    WBC 7.0 07/05/2018    HGB 12.6 (L) 08/09/2018    HCT 37.3 (L) 07/05/2018    MCV 91 07/05/2018     07/05/2018     Results for orders placed or performed in visit on 07/05/18   Basic Metabolic Panel   Result Value Ref Range    Sodium 139 136 - 145 mmol/L    Potassium 4.5 3.5 - 5.0 mmol/L    Chloride 106 98 - 107 mmol/L    CO2 27 22 - 31 mmol/L    Anion Gap, Calculation 6 5 - 18 mmol/L    Glucose 147 (H) 70 - 125 mg/dL    Calcium 9.0 8.5 - 10.5 mg/dL    BUN 30 (H) 8 - 28 mg/dL    Creatinine 1.19 0.70 - 1.30 mg/dL    GFR MDRD Af Amer >60 >60 mL/min/1.73m2    GFR MDRD Non Af Amer 57 (L) >60 mL/min/1.73m2     Lab Results   Component Value Date    HGBA1C 13.8 (H) 12/20/2018       ASSESSMENT/PLAN:    1. Dermatitis - Will start Clobetasol 0.05% two times a day and change to prn when healed.  Continue Vistaril 10 mg daily   2. Multiple open wounds of foot - Improving, continue current treatment plan.   3. DM (diabetes mellitus) type II uncontrolled, periph vascular disorder (H) - Last A1c 13.8, will continue Novolog and Lantus, and repeat A1c   4. Essential hypertension  - Blood pressures are within target range, will continue Losartan, Aldactone, and Lasix   5. Peripheral vascular disease (H) with chronic left foot wounds - Stable                         CARE PLAN: The care plan has been reviewed and discussed with patient and nursing staff. No changes made at this time except those noted above.  We will continue to monitor.        Electronically signed by:  Barbara Bolaños CNP

## 2021-06-19 NOTE — LETTER
Letter by Melina Mackenzie MD at      Author: Melina Mackenzie MD Service: -- Author Type: --    Filed:  Encounter Date: 2019 Status: (Other)         Patient: Neel Navarro   MR Number: [id number]   YOB: 1920   Date of Visit: 2019                                                    [facility]     NAME: Neel Navarro  : 10/12/1920                                                          Code Status:  DNR    Facility:  [facility] [[id number]]                Chief Complaint   Patient presents with   ? Review Of Multiple Medical Conditions         History of Present Illness: Neel is a 89 y.o. male residing at [facility].  He does have:  - Alzheimer's dementia   -Dysphagia : not complying with a thickened liquids as ordered by speech therapy.  He has had episodes of choking on pills.,   - CAD  -PVD with chronic wounds: has been followed.vascular clinic for.  Wounds are being dressed and observed by nursing staff.  - Essential HTN  -Osteoarthritis, primary: bilateral TKAs  -Prostatic hypertrophy with urinary retention  -Diabetes type 2 on insulin    UPDATE:  His HbA1c went from  13.8 in 2018  To 6.2  With the addition of Lantus   mealtime Novolog, sliding scale NOvolog.            Patient Active Problem List   Diagnosis   ? Angina pectoris (H)   ? Carotid artery narrowing   ? Benign neoplasm of colon   ? Late onset Alzheimer's disease without behavioral disturbance   ? Enlarged prostate   ? Gout   ? Leg weakness   ? Neuropathy (H)   ? Primary osteoarthritis involving multiple joints   ? OP (osteoporosis)   ? Peripheral blood vessel disorder (H)   ? Vitamin D deficiency   ? Essential hypertension   ? Closed nondisplaced fracture of acetabulum with routine healing   ? Fall   ? Allergic dermatitis   ? Dermatitis   ? Urinary retention   ? Gastrointestinal bleeding, upper   ? Wheezing   ? Other dysphagia          Allergies   Allergen Reactions   ? Ranitidine Unknown       Current Medications:  Current Outpatient Medications   Medication Sig   ? acetaminophen (TYLENOL) 500 MG tablet Take 1,000 mg by mouth every 6 (six) hours as needed for pain Not to exceed 3 grams in 24 hours.         ? albuterol (PROVENTIL) 2.5 mg /3 mL (0.083 %) nebulizer solution Take 2.5 mg by nebulization every 4 (four) hours as needed for wheezing.    ? allopurinol (ZYLOPRIM) 100 MG tablet Take 100 mg by mouth daily.   ? aluminum-magnesium hydroxide-simethicone (MAALOX ADVANCED) 200-200-20 mg/5 mL Susp Take 30 mL by mouth every 2 (two) hours as needed.   ? ascorbic acid (VITAMIN C) 500 MG tablet Take 500 mg by mouth 2 (two) times a day.    ? bisacodyl (DULCOLAX) 10 mg suppository Insert 10 mg into the rectum daily as needed (constipation).    ? calamine lotion Apply 1 application topically 3 (three) times a day as needed. Apply to affected areas  Topically as needed for itching after the TMC cream is used    ? camphor-menthol (SARNA) lotion Apply 1 application topically as needed for itching. Apply to abdomen and chest daily and PRN   ? carbamide peroxide (DEBROX) 6.5 % otic solution Administer 3 drops into both ears see administration instructions. Instill 3 drops in both ears as needed for ear wax build up twice daily X 3 days. Irrigate using 30 mL syringe and warm water.   ? cetirizine (ZYRTEC) 10 MG tablet Take 10 mg by mouth daily.   ? clobetasol (TEMOVATE) 0.05 % cream Apply 1 application topically 2 (two) times a day as needed.   ? ferrous sulfate 65 mg elemental iron Take 1 tablet by mouth 2 (two) times a day with meals. 325mg daily         ? fluticasone (FLONASE) 50 mcg/actuation nasal spray 1 spray into each nostril daily.    ? furosemide (LASIX) 20 MG tablet Take 40 mg by mouth daily.    ? guaiFENesin (ROBITUSSIN) 100 mg/5 mL syrup Take 200 mg by mouth 4 (four) times a day.   ? hydrOXYzine (ATARAX) 25 MG tablet Take 25 mg by mouth at  bedtime.   ? insulin aspart U-100 (NOVOLOG) 100 unit/mL injection Inject under the skin 3 (three) times a day before meals 200-299= 4 units  300-399= 6 units  400-499= 10 units  500-999= 12 units.         ? insulin aspart U-100 (NOVOLOG) 100 unit/mL injection Inject 10 Units under the skin 3 (three) times a day before meals.   ? insulin glargine (BASAGLAR KWIKPEN) 100 unit/mL (3 mL) pen Inject 12 Units under the skin at bedtime.   ? ipratropium-albuterol (DUO-NEB) 0.5-2.5 mg/3 mL nebulizer Inhale 3 mL 2 (two) times a day.    ? magnesium hydroxide (MAGNESIUM HYDROXIDE) 400 mg/5 mL Susp suspension Take 30 mL by mouth 2 (two) times a day as needed.    ? meloxicam (MOBIC) 7.5 MG tablet Take 1 tablet (7.5 mg total) by mouth daily.   ? omeprazole (PRILOSEC) 20 MG capsule Take 20 mg by mouth daily.   ? ondansetron (ZOFRAN) 4 MG tablet Take 4 mg by mouth every 6 (six) hours as needed for nausea.   ? protein supplement Pack Take 1 packet by mouth 2 (two) times a day.   ? senna-docusate (PERICOLACE) 8.6-50 mg tablet Take 1 tablet by mouth 2 (two) times a day. Do not administer if loose stools (Patient taking differently: Take 1 tablet by mouth daily. Do not administer if loose stools      )   ? spironolactone (ALDACTONE) 25 MG tablet Take 25 mg by mouth daily.           CRISTOPHER  Has a dry tickly cough, rare white phlegm  Nurse on duty today feels the charcoal is macerating his feet; she would like a dry gauze wrapping by day, and leave uncovered overnight in bed               Social History Narrative     He is from [place] and is a retired .  He was  in 2 and has one son alive, one daughter  of breast cancer. His niece is his main caretaker.          Blood pressure 119/58, pulse 79, temperature 97.8  F (36.6  C), resp. rate 22, weight (!) 234 lb 9.6 oz (106.4 kg), SpO2 98 %.            Exam  General Appearance: Pleasant, tells jokes, ambulates himself in a wheelchair  Very moist vocal  quality  Lungs: Soft inspiratory crackles bilaterally, occasional dry cough  Abdomen: Soft, + BS and non tender to palpation  Musculoskeletal :Wound just redressed, nurse described, protective boot on left foot  Neuro: Moves all extremities and has facial symmetry, uses his legs to mobilize himself in a wheelchair  Skin: linear scratches over  both forearms  : clear urine in [place]  Mood: Pleasant  Glucose = 136 yesterday before supper     Labs:     Ref Range & Units 3/25/19    Hemoglobin A1c 4.2 - 6.1 % 6.2 Abnormally high          Ref Range & Units 12/20/18    Hemoglobin A1c: ordered by NP 4.2 - 6.1 % 13.8            Ref Range & Units 8/9/18    Hemoglobin 14.0 - 18.0 g/dL 12.6 Abnormally low                Lab Results   Component Value Date      07/05/2018     K 4.5 07/05/2018      07/05/2018     CO2 27 07/05/2018     BUN 30 (H) 07/05/2018     CREATININE 1.19 07/05/2018     CALCIUM 9.0 07/05/2018               Lab Results   Component Value Date     TSH 4.40 04/12/2018      ASSESSMENT / PLAN:      ICD-10-CM    1. DM (diabetes mellitus) type II uncontrolled, periph vascular disorder (H) E11.51  good control of diabetes, should aid in wound healing of his foot    E11.65    2. PVD (peripheral vascular disease) (H) I73.9  can try off the charcoal, last I heard he was being followed by the wound clinic will see if they agree   3. Essential hypertension I10  control   4. Neuropathy (H) G62.9  feet are numb   5. Primary osteoarthritis involving multiple joints M15.0  on Mobic     On allopurinol for history of gout         Electronically signed by:   Melina Mackenzie MD

## 2021-06-19 NOTE — PROGRESS NOTES
[facility]    DATE: 8/15/2018    NAME:  Neel Navarro             :  10/12/1920  MRN: [id number]  CODE STATUS:  DNR    VISIT TYPE: ACUTE  FACILITY: [facility] [[id number]]      CHIEF COMPLAIN/REASON FOR VISIT:  Chief Complaint   Patient presents with     Problem Visit     Right great toe wound     HISTORY OF PRESENT ILLNESS: Neel Navarro is a 89 y.o. male being seen at the request of the nurses for right great toe ulcer.  Staff reports that his wound is worsening. On August 10, 2018 it measured 0.6 cm x 1.2 cm and then on 2018 it measured 1.7 cm x 0.8 cm.  He has completed antibiotics for a total of 6 weeks.  He also has expiratory wheezes with a h/o aspiration.    Allergies   Allergen Reactions     Ranitidine Unknown   :     Current Outpatient Prescriptions   Medication Sig     acetaminophen (TYLENOL) 500 MG tablet Take 1,000 mg by mouth 3 (three) times a day as needed for pain. Not to exceed 3 grams in 24 hours     albuterol (PROVENTIL) 2.5 mg /3 mL (0.083 %) nebulizer solution Take 2.5 mg by nebulization every 4 (four) hours as needed for wheezing.      allopurinol (ZYLOPRIM) 100 MG tablet Take 100 mg by mouth daily.     aluminum-magnesium hydroxide-simethicone (MAALOX ADVANCED) 200-200-20 mg/5 mL Susp Take 30 mL by mouth every 2 (two) hours as needed.     ascorbic acid (VITAMIN C) 500 MG tablet Take 500 mg by mouth 2 (two) times a day.      bisacodyl (DULCOLAX) 10 mg suppository Insert 10 mg into the rectum daily as needed (constipation).      calamine lotion Apply 1 application topically 3 (three) times a day as needed. Apply to affected areas  Topically as needed for itching after the TMC cream is used      camphor-menthol (SARNA) lotion Apply 1 application topically as needed for itching. Apply to abdomen and chest daily and PRN     carbamide peroxide (DEBROX) 6.5 % otic solution Administer 3 drops into both ears see administration instructions.  Instill 3 drops in both ears as needed for ear wax build up twice daily X 3 days. Irrigate using 30 mL syringe and warm water.     cetirizine (ZYRTEC) 10 MG tablet Take 10 mg by mouth daily.     clobetasol (TEMOVATE) 0.05 % cream Apply 1 application topically 2 (two) times a day as needed.     ferrous sulfate 65 mg elemental iron Take 1 tablet by mouth daily with breakfast. 325mg daily     fluticasone (FLONASE) 50 mcg/actuation nasal spray 1 spray into each nostril daily.      furosemide (LASIX) 20 MG tablet Take 40 mg by mouth daily.      guaiFENesin ER (MUCINEX) 600 mg 12 hr tablet Take 1,200 mg by mouth 2 (two) times a day.     hydrOXYzine (ATARAX) 25 MG tablet Take 25 mg by mouth at bedtime.     ipratropium-albuterol (DUO-NEB) 0.5-2.5 mg/3 mL nebulizer Inhale 3 mL 2 (two) times a day.      losartan (COZAAR) 50 MG tablet Take 50 mg by mouth daily.     magnesium hydroxide (MAGNESIUM HYDROXIDE) 400 mg/5 mL Susp suspension Take 30 mL by mouth 2 (two) times a day as needed.      meloxicam (MOBIC) 7.5 MG tablet Take 1 tablet (7.5 mg total) by mouth daily.     omeprazole (PRILOSEC) 20 MG capsule Take 20 mg by mouth daily.     ondansetron (ZOFRAN) 4 MG tablet Take 4 mg by mouth every 6 (six) hours as needed for nausea.     protein supplement Pack Take 1 packet by mouth 2 (two) times a day.     senna-docusate (PERICOLACE) 8.6-50 mg tablet Take 1 tablet by mouth 2 (two) times a day. Do not administer if loose stools     REVIEW OF SYSTEMS:    Currently, no fever, chills, or rigors. Does not have any visual or hearing problems. Denies any chest pain, headaches, palpitations, lightheadedness, dizziness, shortness of breath, or cough. Appetite is good. Denies any GERD symptoms. Denies any difficulty with swallowing, nausea, or vomiting.  Denies any abdominal pain, diarrhea or constipation. Denies any urinary symptoms. No insomnia. No active bleeding. No rash.       PHYSICAL EXAMINATION:  Vitals reviewd    GENERAL: Awake,  Alert, oriented x3, not in any form of acute distress, answers questions appropriately, follows simple commands, conversant  HEENT: Head is normocephalic with normal hair distribution. No evidence of trauma. Ears: No acute purulent discharge. Eyes: Conjunctivae pink with no scleral jaundice. Nose: Normal mucosa and septum. NECK: Supple with no cervical or supraclavicular lymphadenopathy. Trachea is midline.   CHEST: No tenderness or deformity, no crepitus  LUNG: Clear to auscultation with good chest expansion. There are no crackles or wheezes, normal AP diameter.  BACK: No kyphosis of the thoracic spine. Symmetric, no curvature, ROM normal, no CVA tenderness, no spinal tenderness   CVS: There is good S1  S2, there are no murmurs, rubs, gallops, or heaves, rhythm is regular,  2+ pulses symmetric in all extremities.  ABDOMEN: Globular and soft, nontender to palpation, non distended, no masses, no organomegaly, good bowel sounds, no rebound or guarding, no peritoneal signs.   EXTREMITIES: Atraumatic. Full range of motion on both upper and lower extremities, there is no tenderness to palpation, no pedal edema, no cyanosis or clubbing, no calf tenderness.  Pulses equal in all extremities, normal cap refill, no joint swelling. Demeanor sized toe ulceration with creamy to clear serosanguineous discharge, surrounding erythema, and granulation on right corner  SKIN: Warm and dry, no erythema noted.  Skin color, texture, no rashes or lesions.  NEUROLOGICAL: The patient is oriented to person, place and time. Strength and sensation are grossly intact. Face is symmetric.    LABS:    Lab Results   Component Value Date    WBC 7.0 07/05/2018    HGB 12.6 (L) 08/09/2018    HCT 37.3 (L) 07/05/2018    MCV 91 07/05/2018     07/05/2018       Results for orders placed or performed in visit on 07/05/18   Basic Metabolic Panel   Result Value Ref Range    Sodium 139 136 - 145 mmol/L    Potassium 4.5 3.5 - 5.0 mmol/L    Chloride 106 98  - 107 mmol/L    CO2 27 22 - 31 mmol/L    Anion Gap, Calculation 6 5 - 18 mmol/L    Glucose 147 (H) 70 - 125 mg/dL    Calcium 9.0 8.5 - 10.5 mg/dL    BUN 30 (H) 8 - 28 mg/dL    Creatinine 1.19 0.70 - 1.30 mg/dL    GFR MDRD Af Amer >60 >60 mL/min/1.73m2    GFR MDRD Non Af Amer 57 (L) >60 mL/min/1.73m2     ASSESSMENT/PLAN:    1. Ulcer of toe of right foot, unspecified ulcer stage (H) - This has worsened from previous.  Suspect this is due to a vascular cause with underlying gout.  He was treated with a longer course of Doxycycline 100 mg twice daily for an additional 1 month for a total of 6 weeks. Will defer to the wound clinic for treatment   2. Inflammation of interphalangeal joint of right toe due to infection (H) - See above   3. Other dysphagia - ST to eval and treat    4. Essential hypertension - Blood pressure are within target range, will continue Losartan and Lasix   5. Anemia - Continue Ferrous sulfate BID           Electronically signed by:  Barbara Bolaños CNP    35 minutes spent of which greater than 50% was face to face communication with the patient about above plan of care

## 2021-06-19 NOTE — LETTER
Letter by Melina Mackenzie MD at      Author: Melina Mackenzie MD Service: -- Author Type: --    Filed:  Encounter Date: 2019 Status: (Other)         Patient: Neel Navarro   MR Number: [id number]   YOB: 1920   Date of Visit: 2019                                                    [facility]     NAME: Neel Navarro  : 10/12/1920                                                          Code Status:  DNR    Facility:  [facility] [[id number]]                Chief Complaint   Patient presents with   ? Review Of Multiple Medical Conditions         History of Present Illness: Neel is a 89 y.o. male residing at [facility].  He does have:  - Alzheimer's dementia   -Dysphagia : not complying with a thickened liquids as ordered by speech therapy.  He has had episodes of choking on pills.,   - CAD  -PVD with chronic wounds: has been followed.vascular clinic for.  Wounds are being dressed and observed by nursing staff.  - Essential HTN  -Osteoarthritis, primary: bilateral TKAs  -Prostatic hypertrophy with urinary retention  -Diabetes type 2 on insulin    UPDATE:  He has been having watery diarrhea since .  We checked C. difficile, it was negative.WBC = 11.2 his potassium was low, CO2 was low, BUN and creatinine have gone up a bit consistent with dehydration  We instructed them earlier in the week to be sure he had 1.2 L of fluid a day, as well as started him on potassium replacement.  Nurses report he is been having hyperactive bowel sounds ( not per my exam today below), appetite had been variable, abdomen distended and firm.      His HbA1c went from  13.8 in 2018  To 6.2  With the addition of Lantus, mealtime Novolog, sliding scale Novolog.            Patient Active Problem List   Diagnosis   ? Angina pectoris (H)   ? Carotid artery narrowing   ? Benign neoplasm of colon   ? Late onset Alzheimer's disease  without behavioral disturbance   ? Enlarged prostate   ? Gout   ? Leg weakness   ? Neuropathy (H)   ? Primary osteoarthritis involving multiple joints   ? OP (osteoporosis)   ? Peripheral blood vessel disorder (H)   ? Vitamin D deficiency   ? Essential hypertension   ? Closed nondisplaced fracture of acetabulum with routine healing   ? Fall   ? Allergic dermatitis   ? Dermatitis   ? Urinary retention   ? Gastrointestinal bleeding, upper   ? Wheezing   ? Other dysphagia         Allergies   Allergen Reactions   ? Ranitidine Unknown       Current Medications:  Current Outpatient Medications   Medication Sig   ? acetaminophen (TYLENOL) 500 MG tablet Take 1,000 mg by mouth every 6 (six) hours as needed for pain Not to exceed 3 grams in 24 hours.         ? albuterol (PROVENTIL) 2.5 mg /3 mL (0.083 %) nebulizer solution Take 2.5 mg by nebulization every 4 (four) hours as needed for wheezing.    ? allopurinol (ZYLOPRIM) 100 MG tablet Take 100 mg by mouth daily.   ? aluminum-magnesium hydroxide-simethicone (MAALOX ADVANCED) 200-200-20 mg/5 mL Susp Take 30 mL by mouth every 2 (two) hours as needed.   ? ascorbic acid (VITAMIN C) 500 MG tablet Take 500 mg by mouth 2 (two) times a day.    ? bisacodyl (DULCOLAX) 10 mg suppository Insert 10 mg into the rectum daily as needed (constipation).    ? calamine lotion Apply 1 application topically 3 (three) times a day as needed. Apply to affected areas  Topically as needed for itching after the TMC cream is used    ? camphor-menthol (SARNA) lotion Apply 1 application topically as needed for itching. Apply to abdomen and chest daily and PRN   ? carbamide peroxide (DEBROX) 6.5 % otic solution Administer 3 drops into both ears see administration instructions. Instill 3 drops in both ears as needed for ear wax build up twice daily X 3 days. Irrigate using 30 mL syringe and warm water.   ? cetirizine (ZYRTEC) 10 MG tablet Take 10 mg by mouth daily.   ? clobetasol (TEMOVATE) 0.05 % cream  Apply 1 application topically 2 (two) times a day as needed.   ? ferrous sulfate 65 mg elemental iron Take 1 tablet by mouth 2 (two) times a day with meals. 325mg daily         ? fluticasone (FLONASE) 50 mcg/actuation nasal spray 1 spray into each nostril daily.    ? furosemide (LASIX) 20 MG tablet Take 40 mg by mouth daily.    ? guaiFENesin (ROBITUSSIN) 100 mg/5 mL syrup Take 200 mg by mouth 4 (four) times a day.   ? hydrOXYzine (ATARAX) 25 MG tablet Take 25 mg by mouth at bedtime.   ? insulin aspart U-100 (NOVOLOG) 100 unit/mL injection Inject under the skin 3 (three) times a day before meals 200-299= 4 units  300-399= 6 units  400-499= 10 units  500-999= 12 units.         ? insulin aspart U-100 (NOVOLOG) 100 unit/mL injection Inject 10 Units under the skin 3 (three) times a day before meals.   ? insulin glargine (BASAGLAR KWIKPEN) 100 unit/mL (3 mL) pen Inject 12 Units under the skin at bedtime.   ? ipratropium-albuterol (DUO-NEB) 0.5-2.5 mg/3 mL nebulizer Inhale 3 mL 2 (two) times a day.    ? magnesium hydroxide (MAGNESIUM HYDROXIDE) 400 mg/5 mL Susp suspension Take 30 mL by mouth 2 (two) times a day as needed.    ? meloxicam (MOBIC) 7.5 MG tablet Take 1 tablet (7.5 mg total) by mouth daily. (Patient taking differently: Take 7.5 mg by mouth daily as needed.       )   ? omeprazole (PRILOSEC) 20 MG capsule Take 20 mg by mouth daily.   ? ondansetron (ZOFRAN) 4 MG tablet Take 4 mg by mouth every 6 (six) hours as needed for nausea.   ? potassium chloride (K-DUR,KLOR-CON) 20 MEQ tablet Take by mouth. (40meq Q AM and 20meq Q PM x 7 days)   ? protein supplement Pack Take 1 packet by mouth 2 (two) times a day.   ? senna-docusate (PERICOLACE) 8.6-50 mg tablet Take 1 tablet by mouth 2 (two) times a day. Do not administer if loose stools (Patient taking differently: Take 1 tablet by mouth daily as needed. Do not administer if loose stools      )   ? spironolactone (ALDACTONE) 25 MG tablet Take 25 mg by mouth daily.            ROS  For me he denied being aware of weight loss, denied being aware of diarrhea.  The nurse on duty thought he might be joking around with me            Social History Narrative     He is from [place] and is a retired .  He was  in 1962 and has one son alive, one daughter  of breast cancer. His niece is his main caretaker.          Blood pressure 121/66, pulse 77, temperature 98.3  F (36.8  C), resp. rate 19, weight (!) 225 lb 3.2 oz (102.2 kg), SpO2 95 %.    Weight down about 9# since the beginning of April        Exam  General Appearance: Pleasant, tells jokes, the PT department slowly using a bike apparatus v  Lungs: Soft inspiratory crackles bilaterally, occasional dry cough  Abdomen: Firm, few bowel sounds , some are a bit hollow in quality, non tender to deep palpation  Musculoskeletal :Wound just redressed, nurse described, protective boot on left foot  Neuro: Moves all extremities and has facial symmetry, uses his legs to mobilize himself in a wheelchair  Skin: linear scratches over  both forearms  : clear urine in [place]  Mood: Pleasant  Glucose = 146 yesterday before supper     Labs:     Ref Range & Units 3/25/19    Hemoglobin A1c 4.2 - 6.1 % 6.2 Abnormally high          Ref Range & Units 18    Hemoglobin A1c: ordered by NP 4.2 - 6.1 % 13.8            Ref Range & Units 18    Hemoglobin 14.0 - 18.0 g/dL 12.6 Abnormally low                Lab Results   Component Value Date      2018     K 4.5 2018      2018     CO2 27 2018     BUN 30 (H) 2018     CREATININE 1.19 2018     CALCIUM 9.0 2018               Lab Results   Component Value Date     TSH 4.40 2018      ASSESSMENT / PLAN:    ICD-10-CM    1. Diarrhea, unspecified type R19.7  we will get an abdominal film flat and upright.  He may well have partial small bowel obstruction with numerous electrolyte abnormalities.  His potassium is low even  on replacement, so we will increase him from 20 mEq twice a day up to 40 mEq in the morning and 20 in the evening.  A week from today recheck along with magnesium, sometimes of her magnesium levels low we can replete the potassium.  Earlier in the week had given orders to be sure that he drinks at least 1.2 L a day.   2. Recent weight loss R63.4    3. DM (diabetes mellitus) type II uncontrolled, periph vascular disorder (H) E11.51  improved role    E11.65    4. Peripheral blood vessel disorder (H) I73.9  still getting dressing changes to his wounds on his feet   5. Essential hypertension I10  pressure in good control   6. Neuropathy (H) G62.9        Electronically signed by:   Melina Mackenzie MD

## 2021-06-19 NOTE — LETTER
Letter by Melina Mackenzie MD at      Author: Melina Mackenzie MD Service: -- Author Type: --    Filed:  Encounter Date: 2019 Status: (Other)         Patient: Neel Navarro   MR Number: [id number]   YOB: 1920   Date of Visit: 2019           [facility]    NAME: Neel Navarro  : 10/12/1920           MR# [id number]           Code Status:  DNR  Visit Type: Review Of Multiple Medical Conditions     Facility:  [facility] [[id number]]           Chief Complaint   Patient presents with   ? Review Of Multiple Medical Conditions       History of Present Illness: Neel is a 89 y.o. male residing at [facility].  He does have:  - Alzheimer's dementia   -Dysphagia : not complying with a thickened liquids as ordered by speech therapy.  He has had episodes of choking on pills.  In the past he has had pneumonia, thought to be suspicious for silent aspiration.  Was question whether or not speech-language pathology should evaluate him again.  However I would wonder if he is choosing to be noncompliant and/or his cognitive abilities are such that he would even benefit from speech-language pathology.  Certainly no harm in having them evaluate  - CAD  -PVD with chronic wounds: has been followed.vascular clinic for.  Wounds are being dressed and observed by nursing staff.  - Essential HTN  -Osteoarthritis, primary: bilateral TKAs  -Prostatic hypertrophy with urinary retention    Patient Active Problem List   Diagnosis   ? Angina pectoris (H)   ? Carotid artery narrowing   ? Benign neoplasm of colon   ? Late onset Alzheimer's disease without behavioral disturbance   ? Enlarged prostate   ? Gout   ? Leg weakness   ? Neuropathy (H)   ? Primary osteoarthritis involving multiple joints   ? OP (osteoporosis)   ? Peripheral blood vessel disorder (H)   ? Vitamin D deficiency   ? Essential hypertension   ? Closed nondisplaced fracture of acetabulum  with routine healing   ? Fall   ? Allergic dermatitis   ? Dermatitis   ? Urinary retention   ? Gastrointestinal bleeding, upper   ? Wheezing   ? Other dysphagia       Current Medications:  Current Outpatient Medications   Medication Sig   ? acetaminophen (TYLENOL) 500 MG tablet Take 1,000 mg by mouth every 6 (six) hours as needed for pain Not to exceed 3 grams in 24 hours.         ? albuterol (PROVENTIL) 2.5 mg /3 mL (0.083 %) nebulizer solution Take 2.5 mg by nebulization every 4 (four) hours as needed for wheezing.    ? allopurinol (ZYLOPRIM) 100 MG tablet Take 100 mg by mouth daily.   ? aluminum-magnesium hydroxide-simethicone (MAALOX ADVANCED) 200-200-20 mg/5 mL Susp Take 30 mL by mouth every 2 (two) hours as needed.   ? ascorbic acid (VITAMIN C) 500 MG tablet Take 500 mg by mouth 2 (two) times a day.    ? bisacodyl (DULCOLAX) 10 mg suppository Insert 10 mg into the rectum daily as needed (constipation).    ? calamine lotion Apply 1 application topically 3 (three) times a day as needed. Apply to affected areas  Topically as needed for itching after the TMC cream is used    ? camphor-menthol (SARNA) lotion Apply 1 application topically as needed for itching. Apply to abdomen and chest daily and PRN   ? carbamide peroxide (DEBROX) 6.5 % otic solution Administer 3 drops into both ears see administration instructions. Instill 3 drops in both ears as needed for ear wax build up twice daily X 3 days. Irrigate using 30 mL syringe and warm water.   ? cetirizine (ZYRTEC) 10 MG tablet Take 10 mg by mouth daily.   ? clobetasol (TEMOVATE) 0.05 % cream Apply 1 application topically 2 (two) times a day as needed.   ? ferrous sulfate 65 mg elemental iron Take 1 tablet by mouth daily with breakfast. 325mg daily   ? fluticasone (FLONASE) 50 mcg/actuation nasal spray 1 spray into each nostril daily.    ? furosemide (LASIX) 20 MG tablet Take 40 mg by mouth daily.    ? guaiFENesin ER (MUCINEX) 600 mg 12 hr tablet Take 600 mg by  mouth 2 (two) times a day .         ? hydrOXYzine (ATARAX) 25 MG tablet Take 25 mg by mouth at bedtime.   ? ipratropium-albuterol (DUO-NEB) 0.5-2.5 mg/3 mL nebulizer Inhale 3 mL 2 (two) times a day.    ? magnesium hydroxide (MAGNESIUM HYDROXIDE) 400 mg/5 mL Susp suspension Take 30 mL by mouth 2 (two) times a day as needed.    ? meloxicam (MOBIC) 7.5 MG tablet Take 1 tablet (7.5 mg total) by mouth daily.   ? omeprazole (PRILOSEC) 20 MG capsule Take 20 mg by mouth daily.   ? ondansetron (ZOFRAN) 4 MG tablet Take 4 mg by mouth every 6 (six) hours as needed for nausea.   ? protein supplement Pack Take 1 packet by mouth 2 (two) times a day.   ? senna-docusate (PERICOLACE) 8.6-50 mg tablet Take 1 tablet by mouth 2 (two) times a day. Do not administer if loose stools       ROS  Adalberto states his feet do not  bother him.    He does not think he has trouble swallowing, is not aware of choking when he swallows or coughing  Not worried about high sugar levels( high A1c)      Social History     Social History Narrative    He is from [place] and is a retired .  He was  in  and has one son alive, one daughter  of breast cancer. His niece is his main caretaker.         Blood pressure 112/68, pulse 82, temperature 97.8  F (36.6  C), resp. rate 18, weight (!) 235 lb 12.8 oz (107 kg), SpO2 95 %.        Exam  General Appearance: Pleasant, tells jokes ambulates himself in a wheelchair  Very moist vocal quality  Lungs: Soft inspiratory crackles bilaterally, decreased at the posterior bases   Abdomen: Soft, + BS and non tender to palpation  Musculoskeletal :right great toe looks good, the left first and second toe have continuous maceration with serous drainage, minimal surrounding erythema has teds on both legs, 2+ edema  Neuro: Moves all extremities and has facial symmetry, uses his legs to mobilize himself in a wheelchair  Skin: linear scratches over lower half of the abdomen and both forearms  :  clear urine in [place]  Mood: Pleasant  Glucose = 111 yesterday before supper    Labs:     Ref Range & Units 12/20/18    Hemoglobin A1c: ordered by NP 4.2 - 6.1 % 13.8         Ref Range & Units 8/9/18    Hemoglobin 14.0 - 18.0 g/dL 12.6 Abnormally low        Lab Results   Component Value Date     07/05/2018    K 4.5 07/05/2018     07/05/2018    CO2 27 07/05/2018    BUN 30 (H) 07/05/2018    CREATININE 1.19 07/05/2018    CALCIUM 9.0 07/05/2018       Lab Results   Component Value Date    TSH 4.40 04/12/2018     ASSESSMENT / PLAN:    ICD-10-CM    1. Peripheral vascular disease (H) with chronic left foot wounds I73.9 High sugars not helping healing. Continue wound cares per clinic orders   2. Diabetes Mellitus II with PVD E11.51... On Glargine and Novolog three times a day. Check A1c to see if improved   3. Essential hypertension I10 Good control   4. Other dysphagia R13.19 Noncompliant with texture restrictions   5. Primary osteoarthritis involving multiple joints M15.0 Good pain control with Mobic   6. Chronic idiopathic gout [id number].[id number] No recent flair, on preventive Allopurinol   7. Late onset Alzheimer's disease without behavioral disturbance G30.1 Has niece who is decision maker as his critical thinking is impaired    F02.80            Electronically signed by:   Melina Mackenzie MD

## 2021-06-19 NOTE — PROGRESS NOTES
[facility]    NAME: Neel Navarro  : 10/12/1920           MR# [id number]           Code Status:  DNR  Visit Type: Problem Visit (toe infection) and Review Of Multiple Medical Conditions     Facility:  [facility]  [[id number]]           Chief Complaint   Patient presents with     Problem Visit     toe infection     Review Of Multiple Medical Conditions       History of Present Illness: Neel is a 89 y.o. male being seen today for follow-up.  He recently had placed him on some antibiotics due to potential cellulitis and toe infection and he finished these a couple weeks ago but per nursing report it did improve his surrounding erythema in the toe quite significantly.  He has a history of MRSA.  They are wondering if they should have him see a wound nurse.  He denies any particular problems and has closed toed shoes on now, in the past he has had the toes cut out.    Patient Active Problem List   Diagnosis     Angina pectoris (H)     Carotid artery narrowing     Benign neoplasm of colon     Late onset Alzheimer's disease without behavioral disturbance     Enlarged prostate     Gout     Leg weakness     Neuropathy (H)     Primary osteoarthritis involving multiple joints     OP (osteoporosis)     Peripheral blood vessel disorder (H)     Vitamin D deficiency     Essential hypertension     Closed nondisplaced fracture of acetabulum with routine healing     Fall     Allergic dermatitis     Dermatitis     Urinary retention     Gastrointestinal bleeding, upper     Wheezing     Other dysphagia       Current Medications:  Current Outpatient Prescriptions   Medication Sig     acetaminophen (TYLENOL) 500 MG tablet Take 1,000 mg by mouth 3 (three) times a day as needed for pain. Not to exceed 3 grams in 24 hours     albuterol (PROVENTIL) 2.5 mg /3 mL (0.083 %) nebulizer solution Take 2.5 mg by nebulization every 4 (four) hours as needed for wheezing.      allopurinol  (ZYLOPRIM) 100 MG tablet Take 100 mg by mouth daily.     aluminum-magnesium hydroxide-simethicone (MAALOX ADVANCED) 200-200-20 mg/5 mL Susp Take 30 mL by mouth every 2 (two) hours as needed.     ascorbic acid (VITAMIN C) 500 MG tablet Take 500 mg by mouth 2 (two) times a day.      bisacodyl (DULCOLAX) 10 mg suppository Insert 10 mg into the rectum daily as needed (constipation).      calamine lotion Apply 1 application topically 3 (three) times a day as needed. Apply to affected areas  Topically as needed for itching after the TMC cream is used      camphor-menthol (SARNA) lotion Apply 1 application topically as needed for itching. Apply to abdomen and chest daily and PRN     carbamide peroxide (DEBROX) 6.5 % otic solution Administer 3 drops into both ears see administration instructions. Instill 3 drops in both ears as needed for ear wax build up twice daily X 3 days. Irrigate using 30 mL syringe and warm water.     cetirizine (ZYRTEC) 10 MG tablet Take 10 mg by mouth daily.     clobetasol (TEMOVATE) 0.05 % cream Apply 1 application topically 2 (two) times a day as needed.     ferrous sulfate 65 mg elemental iron Take 1 tablet by mouth daily with breakfast. 325mg daily     fluticasone (FLONASE) 50 mcg/actuation nasal spray 1 spray into each nostril daily.      furosemide (LASIX) 20 MG tablet Take 40 mg by mouth daily.      guaiFENesin ER (MUCINEX) 600 mg 12 hr tablet Take 1,200 mg by mouth 2 (two) times a day.     hydrOXYzine (ATARAX) 25 MG tablet Take 25 mg by mouth at bedtime.     ipratropium-albuterol (DUO-NEB) 0.5-2.5 mg/3 mL nebulizer Inhale 3 mL 2 (two) times a day.      losartan (COZAAR) 50 MG tablet Take 50 mg by mouth daily.     magnesium hydroxide (MAGNESIUM HYDROXIDE) 400 mg/5 mL Susp suspension Take 30 mL by mouth 2 (two) times a day as needed.      meloxicam (MOBIC) 7.5 MG tablet Take 1 tablet (7.5 mg total) by mouth daily.     protein supplement Pack Take 1 packet by mouth 2 (two) times a day.      senna-docusate (PERICOLACE) 8.6-50 mg tablet Take 1 tablet by mouth 2 (two) times a day. Do not administer if loose stools       ROS A comprehensive review of systems was performed and was otherwise negative    Social History:   Social History     Social History Narrative    He is from [place] and is a retired .  He was  in 1962 and has one son alive, one daughter  of breast cancer. His niece is his main caretaker.       Medications were reconciled.  Allergies, social and family history, and the problem list were all reviewed and updated.    Exam  General Appearance: Pleasant, alert in a wheelchair  Vitals:    18 0954   BP: 106/57   Pulse: 66   Temp: 98.1  F (36.7  C)   SpO2: 96%   Weight: (!) 248 lb (112.5 kg)      Body mass index is 30.19 kg/(m^2).  Wt Readings from Last 3 Encounters:   18 (!) 248 lb (112.5 kg)   18 (!) 248 lb 12.8 oz (112.9 kg)   18 (!) 252 lb (114.3 kg)     HEENT: Sclera are clear.   Lungs: Normal respirations  Abdomen: Soft and nondistended  Extremities: Demeanor sized toe ulceration with minimal yellowish serosanguineous discharge and mild surrounding erythema  Skin: No rashes  Neuro: Moves all extremities and has facial symmetry  Gait: Ambulates in a wheelchair    Labs:  All recent labs reviewed in the nursing home record and Epic.    Lab Results   Component Value Date     2018    K 4.5 2018     2018    CO2 27 2018    BUN 30 (H) 2018    CREATININE 1.19 2018    CALCIUM 9.0 2018     Lab Results   Component Value Date    WBC 7.0 2018    HGB 12.1 (L) 2018    HCT 37.3 (L) 2018    MCV 91 2018     2018     No results found for: HGBA1C  Lab Results   Component Value Date    TSH 4.40 2018     Assessment/Plan  1. Ulcer of toe of right foot, unspecified ulcer stage (H)  This has improved from previous.  Suspect this is due to a vascular cause with  underlying gout.  We will treat him with a longer course of doxycycline 100 mg twice daily for 1 month.    2. Peripheral blood vessel disorder (H)  As above not currently on anticoagulants due to previous anemia.    3. Essential hypertension  Stable on medication.    4. Late onset Alzheimer's disease without behavioral disturbance  He has had no recent behavior issues.           Electronically signed by:   Yessenia Pineda MD  Internal and Geriatric medicine    Much or all of the text in this note was generated through the use of Dragon Dictate voice-to-text software. Errors in spelling or words which seem out of context are unintentional. Sound alike errors, in particular, may have escaped editing.

## 2021-06-20 NOTE — PROGRESS NOTES
[facility]    DATE: 2018    NAME:  Neel Navarro             :  10/12/1920  MRN: [id number]  CODE STATUS:  DNR/DNI    FACILITY:  [facility]  [[id number]]       ROOM:   323    CHIEF COMPLAINT/REASON FOR VISIT:  Chief Complaint   Patient presents with     Review Of Multiple Medical Conditions     HISTORY OF PRESENT ILLNESS: Neel Navarro is a 89 y.o. male with CAD, Late onset Alzheimer's disease, HLD, and Dysphagia is being seen today in the long term care facility for follow up and management of multiple chronic medical conditions.  He has a chronic right great toe wound and he is followed by the wound clinic. The wound clinic is debriding the wound and it is much improved.   He was first seen for this back in 2017.  He denies any pain, insomnia, or appetite changes.  He is on thicken liquids for silent aspiration.        Past Medical History:   Diagnosis Date     Bowel obstruction      CAD (coronary artery disease)      Dementia      Dysphagia      Enlarged prostate      Essential hypertension      Gout      HLD (hyperlipidemia)      Late onset Alzheimer's disease without behavioral disturbance 6/10/2010     Right bundle branch block      No past surgical history on file.  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  He was  in 1962 and has one son alive, one daughter  of breast cancer. His niece is his main caretaker.     Allergies   Allergen Reactions     Ranitidine Unknown     Current Outpatient Prescriptions   Medication Sig Dispense Refill     acetaminophen (TYLENOL) 500 MG tablet Take 1,000 mg by mouth 3 (three) times a day as needed for pain. Not to exceed 3 grams in 24 hours       albuterol (PROVENTIL) 2.5 mg /3 mL (0.083 %) nebulizer solution Take 2.5 mg by nebulization every 4 (four) hours as needed for wheezing.        allopurinol (ZYLOPRIM) 100 MG tablet Take 100 mg by mouth daily.       aluminum-magnesium hydroxide-simethicone (MAALOX ADVANCED) 200-200-20 mg/5 mL Susp Take 30 mL by mouth every 2 (two) hours as needed.       ascorbic acid (VITAMIN C) 500 MG tablet Take 500 mg by mouth 2 (two) times a day.        bisacodyl (DULCOLAX) 10 mg suppository Insert 10 mg into the rectum daily as needed (constipation).        calamine lotion Apply 1 application topically 3 (three) times a day as needed. Apply to affected areas  Topically as needed for itching after the TMC cream is used        camphor-menthol (SARNA) lotion Apply 1 application topically as needed for itching. Apply to abdomen and chest daily and PRN       carbamide peroxide (DEBROX) 6.5 % otic solution Administer 3 drops into both ears see administration instructions. Instill 3 drops in both ears as needed for ear wax build up twice daily X 3 days. Irrigate using 30 mL syringe and warm water.       cetirizine (ZYRTEC) 10 MG tablet Take 10 mg by mouth daily.       clobetasol (TEMOVATE) 0.05 % cream Apply 1 application topically 2 (two) times a day as needed.       ferrous sulfate 65 mg elemental iron Take 1 tablet by mouth daily with breakfast. 325mg daily       fluticasone (FLONASE) 50 mcg/actuation nasal spray 1 spray into each nostril daily.        furosemide (LASIX) 20 MG tablet Take 40 mg by mouth daily.        guaiFENesin ER  (MUCINEX) 600 mg 12 hr tablet Take 1,200 mg by mouth 2 (two) times a day.       hydrOXYzine (ATARAX) 25 MG tablet Take 25 mg by mouth at bedtime.       ipratropium-albuterol (DUO-NEB) 0.5-2.5 mg/3 mL nebulizer Inhale 3 mL 2 (two) times a day.        losartan (COZAAR) 50 MG tablet Take 50 mg by mouth daily.       magnesium hydroxide (MAGNESIUM HYDROXIDE) 400 mg/5 mL Susp suspension Take 30 mL by mouth 2 (two) times a day as needed.        meloxicam (MOBIC) 7.5 MG tablet Take 1 tablet (7.5 mg total) by mouth daily. 90 tablet 0     omeprazole (PRILOSEC) 20 MG capsule Take 20 mg by mouth daily.       ondansetron (ZOFRAN) 4 MG tablet Take 4 mg by mouth every 6 (six) hours as needed for nausea.       protein supplement Pack Take 1 packet by mouth 2 (two) times a day.       senna-docusate (PERICOLACE) 8.6-50 mg tablet Take 1 tablet by mouth 2 (two) times a day. Do not administer if loose stools  0     No current facility-administered medications for this visit.        REVIEW OF SYSTEMS:    Currently, no fever, chills, or rigors. Does not have any visual or hearing problems. Denies any chest pain, headaches, palpitations, lightheadedness, dizziness, shortness of breath, or cough. Appetite is good. Denies any GERD symptoms. Denies any difficulty with swallowing, nausea, or vomiting.  Denies any abdominal pain, diarrhea or constipation. Denies any urinary symptoms. No insomnia. No active bleeding. No rash.       PHYSICAL EXAMINATION:  Vitals:    10/01/18 2249   BP: 125/62   Pulse: 90   Resp: 20   Temp: 97.4  F (36.3  C)   SpO2: 95%   Weight: (!) 253 lb 12.8 oz (115.1 kg)       GENERAL: Awake, Alert, oriented x3, not in any form of acute distress, answers questions appropriately, follows simple commands, conversant  HEENT: Head is normocephalic with normal hair distribution. No evidence of trauma. Ears: No acute purulent discharge. Eyes: Conjunctivae pink with no scleral jaundice. Nose: Normal mucosa and septum. NECK: Supple  with no cervical or supraclavicular lymphadenopathy. Trachea is midline.   CHEST: No tenderness or deformity, no crepitus  LUNG: Clear to auscultation with good chest expansion. There are no crackles or wheezes, normal AP diameter.  BACK: No kyphosis of the thoracic spine. Symmetric, no curvature, ROM normal, no CVA tenderness, no spinal tenderness   CVS: There is good S1  S2, there are no murmurs, rubs, gallops, or heaves, rhythm is regular,  2+ pulses symmetric in all extremities.  ABDOMEN: Globular and soft, nontender to palpation, non distended, no masses, no organomegaly, good bowel sounds, no rebound or guarding, no peritoneal signs.   EXTREMITIES:  Full range of motion on both upper and lower extremities, there is no tenderness to palpation, no pedal edema, no cyanosis or clubbing, no calf tenderness.  Pulses equal in all extremities, normal cap refill, no joint swelling.Right great toe with erythema and open area on the knuckle with edema.  SKIN: Warm and dry, no erythema noted.  Skin color, texture, no rashes or lesions.  NEUROLOGICAL: The patient is oriented to person, place and time. Strength and sensation are grossly intact. Face is symmetric.    LABS:    Lab Results   Component Value Date    WBC 7.0 07/05/2018    HGB 12.6 (L) 08/09/2018    HCT 37.3 (L) 07/05/2018    MCV 91 07/05/2018     07/05/2018     Results for orders placed or performed in visit on 07/05/18   Basic Metabolic Panel   Result Value Ref Range    Sodium 139 136 - 145 mmol/L    Potassium 4.5 3.5 - 5.0 mmol/L    Chloride 106 98 - 107 mmol/L    CO2 27 22 - 31 mmol/L    Anion Gap, Calculation 6 5 - 18 mmol/L    Glucose 147 (H) 70 - 125 mg/dL    Calcium 9.0 8.5 - 10.5 mg/dL    BUN 30 (H) 8 - 28 mg/dL    Creatinine 1.19 0.70 - 1.30 mg/dL    GFR MDRD Af Amer >60 >60 mL/min/1.73m2    GFR MDRD Non Af Amer 57 (L) >60 mL/min/1.73m2     ASSESSMENT/PLAN:    1. Ulcer of toe of right foot, unspecified ulcer stage (H) - followed by wound clinic,  continue current treatment    2. Peripheral blood vessel disorder (H) - Not anticoagulated   3. Other dysphagia - Continue thicken liquids   4. Essential hypertension  - Blood pressures are within target range, will continue Losartan and Lasix               CARE PLAN: The care plan has been reviewed and discussed with patient and nursing staff. No changes made at this time.  We will continue to monitor.        Electronically signed by:  Barbara Bolaños CNP

## 2021-06-20 NOTE — PROGRESS NOTES
[facility]    NAME: Neel Navarro  : 10/12/1920           MR# [id number]           Code Status:  DNR  Visit Type: Problem Visit (toe infection) and Review Of Multiple Medical Conditions     Facility:  [facility]  [[id number]]           Chief Complaint   Patient presents with     Problem Visit     toe infection     Review Of Multiple Medical Conditions       History of Present Illness: Neel is a 89 y.o. male with a history of gout and toe arthritis creating ulcers and irritations around the calluses.  Currently has postop shoes on and they have been using some Vaseline gauze and recently was started on some oral Keflex for presumed infection.  They do not hurt and cultures were done growing out diphtheroids.    Patient Active Problem List   Diagnosis     Angina pectoris (H)     Carotid artery narrowing     Benign neoplasm of colon     Late onset Alzheimer's disease without behavioral disturbance     Enlarged prostate     Gout     Leg weakness     Neuropathy (H)     Primary osteoarthritis involving multiple joints     OP (osteoporosis)     Peripheral blood vessel disorder (H)     Vitamin D deficiency     Essential hypertension     Closed nondisplaced fracture of acetabulum with routine healing     Fall     Allergic dermatitis     Dermatitis     Urinary retention     Gastrointestinal bleeding, upper     Wheezing     Other dysphagia       Current Medications:  Current Outpatient Prescriptions   Medication Sig     acetaminophen (TYLENOL) 500 MG tablet Take 1,000 mg by mouth 3 (three) times a day as needed for pain. Not to exceed 3 grams in 24 hours     albuterol (PROVENTIL) 2.5 mg /3 mL (0.083 %) nebulizer solution Take 2.5 mg by nebulization every 4 (four) hours as needed for wheezing.      allopurinol (ZYLOPRIM) 100 MG tablet Take 100 mg by mouth daily.     aluminum-magnesium hydroxide-simethicone (MAALOX ADVANCED) 200-200-20 mg/5 mL Susp Take 30 mL by mouth  every 2 (two) hours as needed.     ascorbic acid (VITAMIN C) 500 MG tablet Take 500 mg by mouth 2 (two) times a day.      bisacodyl (DULCOLAX) 10 mg suppository Insert 10 mg into the rectum daily as needed (constipation).      calamine lotion Apply 1 application topically 3 (three) times a day as needed. Apply to affected areas  Topically as needed for itching after the TMC cream is used      camphor-menthol (SARNA) lotion Apply 1 application topically as needed for itching. Apply to abdomen and chest daily and PRN     carbamide peroxide (DEBROX) 6.5 % otic solution Administer 3 drops into both ears see administration instructions. Instill 3 drops in both ears as needed for ear wax build up twice daily X 3 days. Irrigate using 30 mL syringe and warm water.     cetirizine (ZYRTEC) 10 MG tablet Take 10 mg by mouth daily.     clobetasol (TEMOVATE) 0.05 % cream Apply 1 application topically 2 (two) times a day as needed.     ferrous sulfate 65 mg elemental iron Take 1 tablet by mouth daily with breakfast. 325mg daily     fluticasone (FLONASE) 50 mcg/actuation nasal spray 1 spray into each nostril daily.      furosemide (LASIX) 20 MG tablet Take 40 mg by mouth daily.      guaiFENesin ER (MUCINEX) 600 mg 12 hr tablet Take 1,200 mg by mouth 2 (two) times a day.     hydrOXYzine (ATARAX) 25 MG tablet Take 25 mg by mouth at bedtime.     ipratropium-albuterol (DUO-NEB) 0.5-2.5 mg/3 mL nebulizer Inhale 3 mL 2 (two) times a day.      losartan (COZAAR) 50 MG tablet Take 50 mg by mouth daily.     magnesium hydroxide (MAGNESIUM HYDROXIDE) 400 mg/5 mL Susp suspension Take 30 mL by mouth 2 (two) times a day as needed.      meloxicam (MOBIC) 7.5 MG tablet Take 1 tablet (7.5 mg total) by mouth daily.     omeprazole (PRILOSEC) 20 MG capsule Take 20 mg by mouth daily.     ondansetron (ZOFRAN) 4 MG tablet Take 4 mg by mouth every 6 (six) hours as needed for nausea.     protein supplement Pack Take 1 packet by mouth 2 (two) times a day.      senna-docusate (PERICOLACE) 8.6-50 mg tablet Take 1 tablet by mouth 2 (two) times a day. Do not administer if loose stools       ROS A comprehensive review of systems was performed and was otherwise negative    Social History:   Social History     Social History Narrative    He is from [place] and is a retired .  He was  in 1962 and has one son alive, one daughter  of breast cancer. His niece is his main caretaker.       Medications were reconciled.  Allergies, social and family history, and the problem list were all reviewed and updated.    Old records reviewed: Wound nurse visit, culture results    Exam  General Appearance: Pleasant, ambulates himself in a wheelchair  Vitals:    10/04/18 2003   BP: 118/63   Pulse: 78   SpO2: 95%   Weight: (!) 253 lb (114.8 kg)      Body mass index is 30.8 kg/(m^2).  Wt Readings from Last 3 Encounters:   10/04/18 (!) 253 lb (114.8 kg)   10/01/18 (!) 253 lb 12.8 oz (115.1 kg)   18 (!) 248 lb (112.5 kg)     HEENT: Sclera are clear.   Lungs: Normal respirations  Abdomen: Soft and nondistended  Extremities: Toe ulcerations on the right great toe and the left second toe, palpable dorsalis pedis pulses with some surrounding area edema  Skin: No rashes  Neuro: Moves all extremities and has facial symmetry  Gait: With a wheelchair    Labs:  All recent labs reviewed in the nursing home record and Epic.    Lab Results   Component Value Date     2018    K 4.5 2018     2018    CO2 27 2018    BUN 30 (H) 2018    CREATININE 1.19 2018    CALCIUM 9.0 2018     Lab Results   Component Value Date    WBC 7.0 2018    HGB 12.6 (L) 2018    HCT 37.3 (L) 2018    MCV 91 2018     2018     No results found for: HGBA1C  Lab Results   Component Value Date    TSH 4.40 2018     Assessment/Plan  1. Ulcers of toes, unspecified ulcer stage (H)  These are slow to heal, likely  due to high bacterial load and will use a topical gentamicin and bacitracin one-to-one ratio and apply daily to decrease the bacterial load.  If this is not effective may need to use idofoam.    2. Essential hypertension  Stable on medication.    3. Late onset Alzheimer's disease without behavioral disturbance  He is fairly forgetful but still interacts well and likes to joke.  Continue to provide a safe and comfortable environment.           Electronically signed by:   Yessenia Pineda MD  Internal and Geriatric medicine    Much or all of the text in this note was generated through the use of Dragon Dictate voice-to-text software. Errors in spelling or words which seem out of context are unintentional. Sound alike errors, in particular, may have escaped editing.

## 2021-06-21 NOTE — PROGRESS NOTES
[facility]      NAME:  Neel Navarro             :  10/12/1920  MRN: [id number]  CODE STATUS:  DNR/DNI    VISIT TYPE: ACUTE  FACILITY: [facility]  [[id number]]     ROOM: 323    CHIEF COMPLAIN/REASON FOR VISIT:  Chief Complaint   Patient presents with     Review Of Multiple Medical Conditions     HISTORY OF PRESENT ILLNESS: Neel Navarro is a 89 y.o. male being seen at the request of the nurses for wounds on his toes.  He was seen by the wound nurse and she ordered the treatment of cleansing with wound  than applying Isosorb gel to the open areas coupled with oral antibiotics.  Later, MD added an antibiotic mixture of bacitracin and gentamycin to affected toes.  Staff was concerned that this treatment caused the wound to get too moist and the wound is looking worse.  Now there is a new scab on the left great toe. Staff updated the writer and the treatment was put on hold until today.   Per assessment, the wounds are now looking better.    Allergies   Allergen Reactions     Ranitidine Unknown   :     Current Outpatient Prescriptions   Medication Sig     acetaminophen (TYLENOL) 500 MG tablet Take 1,000 mg by mouth 3 (three) times a day as needed for pain. Not to exceed 3 grams in 24 hours     albuterol (PROVENTIL) 2.5 mg /3 mL (0.083 %) nebulizer solution Take 2.5 mg by nebulization every 4 (four) hours as needed for wheezing.      allopurinol (ZYLOPRIM) 100 MG tablet Take 100 mg by mouth daily.     aluminum-magnesium hydroxide-simethicone (MAALOX ADVANCED) 200-200-20 mg/5 mL Susp Take 30 mL by mouth every 2 (two) hours as needed.     ascorbic acid (VITAMIN C) 500 MG tablet Take 500 mg by mouth 2 (two) times a day.      bisacodyl (DULCOLAX) 10 mg suppository Insert 10 mg into the rectum daily as needed (constipation).      calamine lotion Apply 1 application topically 3 (three) times a day as needed. Apply to affected areas  Topically as needed for itching  after the TMC cream is used      camphor-menthol (SARNA) lotion Apply 1 application topically as needed for itching. Apply to abdomen and chest daily and PRN     carbamide peroxide (DEBROX) 6.5 % otic solution Administer 3 drops into both ears see administration instructions. Instill 3 drops in both ears as needed for ear wax build up twice daily X 3 days. Irrigate using 30 mL syringe and warm water.     cetirizine (ZYRTEC) 10 MG tablet Take 10 mg by mouth daily.     clobetasol (TEMOVATE) 0.05 % cream Apply 1 application topically 2 (two) times a day as needed.     ferrous sulfate 65 mg elemental iron Take 1 tablet by mouth daily with breakfast. 325mg daily     fluticasone (FLONASE) 50 mcg/actuation nasal spray 1 spray into each nostril daily.      furosemide (LASIX) 20 MG tablet Take 40 mg by mouth daily.      guaiFENesin ER (MUCINEX) 600 mg 12 hr tablet Take 1,200 mg by mouth 2 (two) times a day.     hydrOXYzine (ATARAX) 25 MG tablet Take 25 mg by mouth at bedtime.     ipratropium-albuterol (DUO-NEB) 0.5-2.5 mg/3 mL nebulizer Inhale 3 mL 2 (two) times a day.      losartan (COZAAR) 50 MG tablet Take 50 mg by mouth daily.     magnesium hydroxide (MAGNESIUM HYDROXIDE) 400 mg/5 mL Susp suspension Take 30 mL by mouth 2 (two) times a day as needed.      meloxicam (MOBIC) 7.5 MG tablet Take 1 tablet (7.5 mg total) by mouth daily.     omeprazole (PRILOSEC) 20 MG capsule Take 20 mg by mouth daily.     ondansetron (ZOFRAN) 4 MG tablet Take 4 mg by mouth every 6 (six) hours as needed for nausea.     protein supplement Pack Take 1 packet by mouth 2 (two) times a day.     senna-docusate (PERICOLACE) 8.6-50 mg tablet Take 1 tablet by mouth 2 (two) times a day. Do not administer if loose stools     REVIEW OF SYSTEMS:    Currently, no fever, chills, or rigors. Does not have any visual or hearing problems. Denies any chest pain, headaches, palpitations, lightheadedness, dizziness, shortness of breath with exertion, no cough.  Appetite is good. Denies any GERD symptoms. Denies nausea, or vomiting but does have dysphagia.  Denies any abdominal pain, diarrhea, or constipation. Denies any urinary symptoms. No insomnia. No active bleeding. No rash.     PHYSICAL EXAMINATION:  Vitals:    10/25/18 2300   BP: 134/73   Pulse: 89   Resp: 20   Temp: 98.6  F (37  C)   SpO2: 92%   Weight: (!) 254 lb 6.4 oz (115.4 kg)     GENERAL: Awake, Alert, oriented x3, not in any form of acute distress, answers questions appropriately, follows simple commands, conversant  HEENT: Head is normocephalic with normal hair distribution. No evidence of trauma. Ears: No acute purulent discharge. Eyes: Conjunctivae pink with no scleral jaundice. Nose: Normal mucosa and septum. NECK: Supple with no cervical or supraclavicular lymphadenopathy. Trachea is midline.   CHEST: No tenderness or deformity, no crepitus  LUNG: Decreased to auscultation with poor chest expansion. There are expiratory wheezes, normal AP diameter.  BACK: No kyphosis of the thoracic spine. Symmetric, no curvature, ROM normal, no CVA tenderness, no spinal tenderness   CVS: There is good S1  S2, there are no murmurs, rubs, gallops, or heaves, rhythm is regular,  2+ pulses symmetric in all extremities.  ABDOMEN: Globular and soft, nontender to palpation, non distended, no masses, no organomegaly, good bowel sounds, no rebound or guarding, no peritoneal signs.   EXTREMITIES: Full range of motion on upper extremities and limited ROM of lower extremites, there is no tenderness to palpation, lower extremity edema, no cyanosis or clubbing, no calf tenderness.  Pulses are  Equal but decreased in lower extremities, normal cap refill.  SKIN:  Inflammation of interphalangeal joint of left and right great toe   NEUROLOGICAL: The patient is oriented to person, place and time. Strength and sensation are grossly intact. Face is symmetric.    LABS:    Lab Results   Component Value Date    WBC 7.0 07/05/2018    HGB 12.6  (L) 08/09/2018    HCT 37.3 (L) 07/05/2018    MCV 91 07/05/2018     07/05/2018       Results for orders placed or performed in visit on 07/05/18   Basic Metabolic Panel   Result Value Ref Range    Sodium 139 136 - 145 mmol/L    Potassium 4.5 3.5 - 5.0 mmol/L    Chloride 106 98 - 107 mmol/L    CO2 27 22 - 31 mmol/L    Anion Gap, Calculation 6 5 - 18 mmol/L    Glucose 147 (H) 70 - 125 mg/dL    Calcium 9.0 8.5 - 10.5 mg/dL    BUN 30 (H) 8 - 28 mg/dL    Creatinine 1.19 0.70 - 1.30 mg/dL    GFR MDRD Af Amer >60 >60 mL/min/1.73m2    GFR MDRD Non Af Amer 57 (L) >60 mL/min/1.73m2       ASSESSMENT/PLAN:    1. Ulcer of toe of right foot, unspecified ulcer stage (H) - Improving, follow recommendation from wound clinic until seen in the vascular clinic   2. Ulcer of left foot, unspecified ulcer stage (H) - Will follow up with wound nurse. Slow healing to nonhealing of the wounds are most likely related to vascular insufficiency, will also follow up and vascular clinic for further treatment   3. PVD (peripheral vascular disease) (H) - Follow up with vascular clinic   4. Essential hypertension - Blood pressures are within target range, will continue Losartan and Lasix   5. Inflammation of interphalangeal joint of left toe due to infection (H) - Competed antibiotics, will follow up with the vascular clinic           Electronically signed by:  Barbara Bolaños CNP    35 minutes spent of which greater than 50% was face to face communication with the patient about above plan of care

## 2021-06-22 NOTE — PROGRESS NOTES
[facility]    NAME:  Neel Navarro             :  10/12/1920  MRN: [id number]  CODE STATUS:  DNR/DNI    FACILITY:  [facility]  [[id number]]       ROOM:   323    CHIEF COMPLAINT/REASON FOR VISIT:  Chief Complaint   Patient presents with     Review Of Multiple Medical Conditions     HISTORY OF PRESENT ILLNESS: Neel Navarro is a 89 y.o. male with CAD, Late onset Alzheimer's disease, HLD, and Dysphagia is being seen today in the long term care facility for follow up and management of multiple chronic medical conditions.        Today, patient is seen at the bedside.  He was seen by Vascular for his chronic wounds and it was noted that these wounds will most likely never heal.  Staff will continue current treatment and monitor for any signs or symptoms of infection.  Staff reports that he is noncompliant with current dietary restrictions in regards to his dysphagia.  When asked, he stated that he never knew he was suppose to have thicken liquids.  He takes Mucinex and has been know to choke off this pill.  He has also had some loose stools.    Past Medical History:   Diagnosis Date     Allergic dermatitis 2017     Angina pectoris (H) 2003     Benign neoplasm of colon 2006    Overview:  Adenoma  Declines colonoscopy at 89 years old     Bowel obstruction (H)      CAD (coronary artery disease)      Carotid artery narrowing 3/1/2010     Closed nondisplaced fracture of acetabulum with routine healing 2016     Dementia      Dermatitis 2/3/2018     Dysphagia      Enlarged prostate      Essential hypertension      Fall 2016     Gastrointestinal bleeding, upper 2007    Overview:  UGI bleed 2nd to esophagitis +/- Esophageal angioectasia     Gout      HLD (hyperlipidemia)      Late onset Alzheimer's disease without behavioral disturbance 6/10/2010     Leg weakness 2011     Neuropathy (H) 2010     OP (osteoporosis) 2007     Overview:  Osteopenia on DEXA   R Hip Fx      Other dysphagia 2018     Peripheral blood vessel disorder (H) 3/17/2004    Overview:  Epic      Primary osteoarthritis involving multiple joints 2004    Overview:  Epic      Right bundle branch block      Urinary retention 2/3/2018    Has a chronic peña     Vitamin D deficiency 3/1/2010     Wheezing 3/21/2018     Past Surgical History:   Procedure Laterality Date     REPLACEMENT TOTAL KNEE BILATERAL       Family History   Problem Relation Age of Onset     Other Mother          of old age in her 80s     Heart attack Father          age 78     Heart attack Brother      Breast cancer Daughter      Social History     Socioeconomic History     Marital status:      Spouse name: Not on file     Number of children: Not on file     Years of education: Not on file     Highest education level: Not on file   Social Needs     Financial resource strain: Not on file     Food insecurity - worry: Not on file     Food insecurity - inability: Not on file     Transportation needs - medical: Not on file     Transportation needs - non-medical: Not on file   Occupational History     Occupation:      Comment: retired   Tobacco Use     Smoking status: Former Smoker     Smokeless tobacco: Never Used   Substance and Sexual Activity     Alcohol use: No     Drug use: No     Sexual activity: No   Other Topics Concern     Not on file   Social History Narrative    He is from [place] and is a retired .  He was  in 1962 and has one son alive, one daughter  of breast cancer. His niece is his main caretaker.     Allergies   Allergen Reactions     Ranitidine Unknown     Current Outpatient Medications   Medication Sig Dispense Refill     acetaminophen (TYLENOL) 500 MG tablet Take 1,000 mg by mouth every 6 (six) hours as needed for pain Not to exceed 3 grams in 24 hours.             albuterol (PROVENTIL) 2.5 mg /3 mL (0.083  %) nebulizer solution Take 2.5 mg by nebulization every 4 (four) hours as needed for wheezing.        allopurinol (ZYLOPRIM) 100 MG tablet Take 100 mg by mouth daily.       aluminum-magnesium hydroxide-simethicone (MAALOX ADVANCED) 200-200-20 mg/5 mL Susp Take 30 mL by mouth every 2 (two) hours as needed.       ascorbic acid (VITAMIN C) 500 MG tablet Take 500 mg by mouth 2 (two) times a day.        bisacodyl (DULCOLAX) 10 mg suppository Insert 10 mg into the rectum daily as needed (constipation).        calamine lotion Apply 1 application topically 3 (three) times a day as needed. Apply to affected areas  Topically as needed for itching after the TMC cream is used        camphor-menthol (SARNA) lotion Apply 1 application topically as needed for itching. Apply to abdomen and chest daily and PRN       carbamide peroxide (DEBROX) 6.5 % otic solution Administer 3 drops into both ears see administration instructions. Instill 3 drops in both ears as needed for ear wax build up twice daily X 3 days. Irrigate using 30 mL syringe and warm water.       cetirizine (ZYRTEC) 10 MG tablet Take 10 mg by mouth daily.       clobetasol (TEMOVATE) 0.05 % cream Apply 1 application topically 2 (two) times a day as needed.       ferrous sulfate 65 mg elemental iron Take 1 tablet by mouth daily with breakfast. 325mg daily       fluticasone (FLONASE) 50 mcg/actuation nasal spray 1 spray into each nostril daily.        furosemide (LASIX) 20 MG tablet Take 40 mg by mouth daily.        guaiFENesin ER (MUCINEX) 600 mg 12 hr tablet Take 600 mg by mouth 2 (two) times a day .             hydrOXYzine (ATARAX) 25 MG tablet Take 25 mg by mouth at bedtime.       ipratropium-albuterol (DUO-NEB) 0.5-2.5 mg/3 mL nebulizer Inhale 3 mL 2 (two) times a day.        losartan (COZAAR) 50 MG tablet Take 50 mg by mouth daily.       magnesium hydroxide (MAGNESIUM HYDROXIDE) 400 mg/5 mL Susp suspension Take 30 mL by mouth 2 (two) times a day as needed.         meloxicam (MOBIC) 7.5 MG tablet Take 1 tablet (7.5 mg total) by mouth daily. 90 tablet 0     omeprazole (PRILOSEC) 20 MG capsule Take 20 mg by mouth daily.       ondansetron (ZOFRAN) 4 MG tablet Take 4 mg by mouth every 6 (six) hours as needed for nausea.       protein supplement Pack Take 1 packet by mouth 2 (two) times a day.       senna-docusate (PERICOLACE) 8.6-50 mg tablet Take 1 tablet by mouth 2 (two) times a day. Do not administer if loose stools  0     No current facility-administered medications for this visit.        REVIEW OF SYSTEMS:    Currently, no fever, chills, or rigors. Does not have any visual or hearing problems. Denies any chest pain, headaches, palpitations, lightheadedness, dizziness, shortness of breath, or cough. Appetite is good. Denies any GERD symptoms. Denies any difficulty with swallowing, nausea, or vomiting.  Denies any abdominal pain, diarrhea or constipation. Denies any urinary symptoms. No insomnia. No active bleeding. No rash.       PHYSICAL EXAMINATION:  Vitals:    11/29/18 2222   BP: 116/59   Pulse: 90   Resp: 18   Temp: (!) 96.1  F (35.6  C)   SpO2: 92%   Weight: (!) 250 lb (113.4 kg)       GENERAL: Awake, Alert, oriented x3, not in any form of acute distress, answers questions appropriately, follows simple commands, conversant  HEENT: Head is normocephalic with normal hair distribution. No evidence of trauma. Ears: No acute purulent discharge. Eyes: Conjunctivae pink with no scleral jaundice. Nose: Normal mucosa and septum. NECK: Supple with no cervical or supraclavicular lymphadenopathy. Trachea is midline.   CHEST: No tenderness or deformity, no crepitus  LUNG: Clear to auscultation with good chest expansion. There are no crackles or wheezes, normal AP diameter.  BACK: No kyphosis of the thoracic spine. Symmetric, no curvature, ROM normal, no CVA tenderness, no spinal tenderness   CVS: There is good S1  S2, there are no murmurs, rubs, gallops, or heaves, rhythm is regular,   2+ pulses symmetric in all extremities.  ABDOMEN: Globular and soft, nontender to palpation, non distended, no masses, no organomegaly, good bowel sounds, no rebound or guarding, no peritoneal signs.   EXTREMITIES:  Full range of motion on both upper and lower extremities, there is no tenderness to palpation, no pedal edema, no cyanosis or clubbing, no calf tenderness.  Pulses equal in all extremities, normal cap refill, no joint swelling.Right great toe with erythema and open area on the knuckle with edema.  SKIN: Warm and dry, no erythema noted.  Skin color, texture, no rashes or lesions.  NEUROLOGICAL: The patient is oriented to person, place and time. Strength and sensation are grossly intact. Face is symmetric.    LABS:    Lab Results   Component Value Date    WBC 7.0 07/05/2018    HGB 12.6 (L) 08/09/2018    HCT 37.3 (L) 07/05/2018    MCV 91 07/05/2018     07/05/2018     Results for orders placed or performed in visit on 07/05/18   Basic Metabolic Panel   Result Value Ref Range    Sodium 139 136 - 145 mmol/L    Potassium 4.5 3.5 - 5.0 mmol/L    Chloride 106 98 - 107 mmol/L    CO2 27 22 - 31 mmol/L    Anion Gap, Calculation 6 5 - 18 mmol/L    Glucose 147 (H) 70 - 125 mg/dL    Calcium 9.0 8.5 - 10.5 mg/dL    BUN 30 (H) 8 - 28 mg/dL    Creatinine 1.19 0.70 - 1.30 mg/dL    GFR MDRD Af Amer >60 >60 mL/min/1.73m2    GFR MDRD Non Af Amer 57 (L) >60 mL/min/1.73m2     ASSESSMENT/PLAN:    1. PVD (peripheral vascular disease) (H) - With chronic wounds that most likely will never heal, will continue with current treatment orders and monitor for infection   2. Other dysphagia - Noncompliant with diet restrictions, staff will encourage thicken liquids at meal time and will change Mucinex to Robitussin 10 cc every 4 hours and discontinue Mucinex   3. Constipation, unspecified constipation type - Decrease Senna to 1 tab two times a day due to some loose stools   4. Essential hypertension - Blood pressures are within  target range, will continue Losartan and Lasix   5. Urinary retention - Continue with indwelling catheter                     CARE PLAN: The care plan has been reviewed and discussed with patient and nursing staff. No changes made at this time except those noted above.  We will continue to monitor.        Electronically signed by:  Barbara Bolaños CNP

## 2021-06-22 NOTE — PROGRESS NOTES
[facility]      NAME:  Neel Navarro             :  10/12/1920  MRN: [id number]  CODE STATUS:  DNR/DNI    VISIT TYPE: ACUTE  FACILITY: [facility]  [[id number]]     ROOM: 323    CHIEF COMPLAIN/REASON FOR VISIT:  Chief Complaint   Patient presents with     Problem Visit     UTI, Hyperglycemia, Thrush, and Hypotension     HISTORY OF PRESENT ILLNESS: Neel Navarro is a 89 y.o. male being seen at the request of the nurses for elevated blood sugars and UTI.  He has completed antibiotics for a UTI.  His blood sugars were extremely elevated and her was started on scheduled Insulin and SSI.  Blood sugars could be elevated due to infection.  He has a chronic intermittent rash that is not visible upon examination.  Currently on Vistaril.  His blood pressures are dropping.  He is currently on Lasix, Losartan, Spironolactone.  He reports some pain in his throat.  Upon examination, his tongue was swollen with bumps, painful upon palpation, and difficulty swallowing due to pain.    Allergies   Allergen Reactions     Ranitidine Unknown   :     Current Outpatient Medications   Medication Sig     acetaminophen (TYLENOL) 500 MG tablet Take 1,000 mg by mouth every 6 (six) hours as needed for pain Not to exceed 3 grams in 24 hours.           albuterol (PROVENTIL) 2.5 mg /3 mL (0.083 %) nebulizer solution Take 2.5 mg by nebulization every 4 (four) hours as needed for wheezing.      allopurinol (ZYLOPRIM) 100 MG tablet Take 100 mg by mouth daily.     aluminum-magnesium hydroxide-simethicone (MAALOX ADVANCED) 200-200-20 mg/5 mL Susp Take 30 mL by mouth every 2 (two) hours as needed.     ascorbic acid (VITAMIN C) 500 MG tablet Take 500 mg by mouth 2 (two) times a day.      bisacodyl (DULCOLAX) 10 mg suppository Insert 10 mg into the rectum daily as needed (constipation).      calamine lotion Apply 1 application topically 3 (three) times a day as needed. Apply to affected areas   Topically as needed for itching after the [facility] cream is used      camphor-menthol (SARNA) lotion Apply 1 application topically as needed for itching. Apply to abdomen and chest daily and PRN     carbamide peroxide (DEBROX) 6.5 % otic solution Administer 3 drops into both ears see administration instructions. Instill 3 drops in both ears as needed for ear wax build up twice daily X 3 days. Irrigate using 30 mL syringe and warm water.     cetirizine (ZYRTEC) 10 MG tablet Take 10 mg by mouth daily.     clobetasol (TEMOVATE) 0.05 % cream Apply 1 application topically 2 (two) times a day as needed.     ferrous sulfate 65 mg elemental iron Take 1 tablet by mouth daily with breakfast. 325mg daily     fluticasone (FLONASE) 50 mcg/actuation nasal spray 1 spray into each nostril daily.      furosemide (LASIX) 20 MG tablet Take 40 mg by mouth daily.      guaiFENesin ER (MUCINEX) 600 mg 12 hr tablet Take 600 mg by mouth 2 (two) times a day .           hydrOXYzine (ATARAX) 25 MG tablet Take 25 mg by mouth at bedtime.     insulin aspart U-100 (NOVOLOG) 100 unit/mL injection Inject under the skin 3 (three) times a day before meals 200-299= 4 units  300-399= 6 units  400-499= 10 units  500-999= 12 units.           insulin aspart U-100 (NOVOLOG) 100 unit/mL injection Inject 10 Units under the skin 3 (three) times a day before meals.     insulin glargine (BASAGLAR KWIKPEN) 100 unit/mL (3 mL) pen Inject 12 Units under the skin at bedtime.     ipratropium-albuterol (DUO-NEB) 0.5-2.5 mg/3 mL nebulizer Inhale 3 mL 2 (two) times a day.      losartan (COZAAR) 50 MG tablet Take 50 mg by mouth daily.     magnesium hydroxide (MAGNESIUM HYDROXIDE) 400 mg/5 mL Susp suspension Take 30 mL by mouth 2 (two) times a day as needed.      meloxicam (MOBIC) 7.5 MG tablet Take 1 tablet (7.5 mg total) by mouth daily.     omeprazole (PRILOSEC) 20 MG capsule Take 20 mg by mouth daily.     ondansetron (ZOFRAN) 4 MG tablet Take 4 mg by mouth every 6 (six)  hours as needed for nausea.     protein supplement Pack Take 1 packet by mouth 2 (two) times a day.     senna-docusate (PERICOLACE) 8.6-50 mg tablet Take 1 tablet by mouth 2 (two) times a day. Do not administer if loose stools     REVIEW OF SYSTEMS:    Currently, no fever, chills, or rigors. Does not have any visual or hearing problems. Denies any chest pain, headaches, palpitations, lightheadedness, dizziness, shortness of breath, or cough. Appetite is good. Denies any GERD symptoms. Denies any difficulty with swallowing, nausea, or vomiting.  Denies any abdominal pain, diarrhea or constipation. Denies any urinary symptoms. No insomnia. No active bleeding. No rash.     PHYSICAL EXAMINATION:  Vitals:    12/21/18 1635   BP: 93/53   Pulse: 74   Resp: 20   Temp: 98  F (36.7  C)   SpO2: 91%   Weight: (!) 240 lb (108.9 kg)     GENERAL: Awake, Alert, oriented x3, not in any form of acute distress, answers questions appropriately, follows simple commands, conversant  HEENT: Head is normocephalic with normal hair distribution. No evidence of trauma. Ears: No acute purulent discharge. Eyes: Conjunctivae pink with no scleral jaundice. Nose: Normal mucosa and septum. NECK: Supple with no cervical or supraclavicular lymphadenopathy. Trachea is midline.   CHEST: No tenderness or deformity, no crepitus  LUNG: Clear to auscultation with good chest expansion. There are no crackles or wheezes, normal AP diameter.  BACK: No kyphosis of the thoracic spine. Symmetric, no curvature, ROM normal, no CVA tenderness, no spinal tenderness   CVS: There is good S1  S2, there are no murmurs, rubs, gallops, or heaves, rhythm is regular,  2+ pulses symmetric in all extremities.  ABDOMEN: Globular and soft, nontender to palpation, non distended, no masses, no organomegaly, good bowel sounds, no rebound or guarding, no peritoneal signs.   EXTREMITIES: Limited range of motion on both upper and lower extremities, there is no tenderness to palpation,  bilateral lower extremity edema, no cyanosis or clubbing, no calf tenderness.  Pulses equal in all extremities, normal cap refill, no joint swelling.  SKIN: Warm and dry, no erythema noted.  Skin color, texture, no rashes or lesions.  NEUROLOGICAL: The patient is oriented to person, place and time. Strength and sensation are grossly intact. Face is symmetric.    LABS:    Lab Results   Component Value Date    WBC 7.0 07/05/2018    HGB 12.6 (L) 08/09/2018    HCT 37.3 (L) 07/05/2018    MCV 91 07/05/2018     07/05/2018       Results for orders placed or performed in visit on 07/05/18   Basic Metabolic Panel   Result Value Ref Range    Sodium 139 136 - 145 mmol/L    Potassium 4.5 3.5 - 5.0 mmol/L    Chloride 106 98 - 107 mmol/L    CO2 27 22 - 31 mmol/L    Anion Gap, Calculation 6 5 - 18 mmol/L    Glucose 147 (H) 70 - 125 mg/dL    Calcium 9.0 8.5 - 10.5 mg/dL    BUN 30 (H) 8 - 28 mg/dL    Creatinine 1.19 0.70 - 1.30 mg/dL    GFR MDRD Af Amer >60 >60 mL/min/1.73m2    GFR MDRD Non Af Amer 57 (L) >60 mL/min/1.73m2       Lab Results   Component Value Date    HGBA1C 13.8 (H) 12/20/2018        ASSESSMENT/PLAN:     1. Urinary tract infection without hematuria, site unspecified - Completed antibiotics   2. Essential hypertension - Blood pressures are dropping, will decrease Losartan to 25 mg daily and monitor for 1 week, if continues to drop will decrease further to 12.5 mg daily and possibly discontinue.     3. Hyperglycemia - Check Hgb A1c and continue Insulin regimen, may need to titrate as blood sugars decrease   4. PVD (peripheral vascular disease) (H) - With chronic wounds that most likely will never heal, will continue with current treatment orders and monitor for infection. Followed by the vascular clinic   5. Thrush - Will start Nystatin 5 ml four times a day for 1 week                 Electronically signed by:  Barbara Bolaños CNP    35 minutes spent of which greater than 50% was face to face  communication with the patient about above plan of care

## 2021-06-22 NOTE — PROGRESS NOTES
[facility]    NAME: Neel Navarro  : 10/12/1920           MR# [id number]           Code Status:  DNR  Visit Type: Review Of Multiple Medical Conditions     Facility:  [facility]  [[id number]]           Chief Complaint   Patient presents with     Review Of Multiple Medical Conditions       History of Present Illness: Neel is a 89 y.o. male having at [facility].  He does have Alzheimer's dementia, and the staff reports that he is not complying with a thickened liquids as ordered by speech therapy.  He has had episodes of choking on pills.  In the past he has had pneumonia, thought to be suspicious for silent aspiration.  Was question whether or not speech-language pathology should evaluate him again.  However I would wonder if he is choosing to be noncompliant and/or his cognitive abilities are such that he would even benefit from speech-language pathology.  Certainly no harm in having them evaluate    As well as followed by the vascular clinic for chronic wounds, and staff had stated it was likely they would not heal.  Wounds are being dressed and observed by nursing staff.    Patient Active Problem List   Diagnosis     Angina pectoris (H)     Carotid artery narrowing     Benign neoplasm of colon     Late onset Alzheimer's disease without behavioral disturbance     Enlarged prostate     Gout     Leg weakness     Neuropathy (H)     Primary osteoarthritis involving multiple joints     OP (osteoporosis)     Peripheral blood vessel disorder (H)     Vitamin D deficiency     Essential hypertension     Closed nondisplaced fracture of acetabulum with routine healing     Fall     Allergic dermatitis     Dermatitis     Urinary retention     Gastrointestinal bleeding, upper     Wheezing     Other dysphagia       Current Medications:  Current Outpatient Medications   Medication Sig     acetaminophen (TYLENOL) 500 MG tablet Take 1,000 mg by mouth every 6  (six) hours as needed for pain Not to exceed 3 grams in 24 hours.           albuterol (PROVENTIL) 2.5 mg /3 mL (0.083 %) nebulizer solution Take 2.5 mg by nebulization every 4 (four) hours as needed for wheezing.      allopurinol (ZYLOPRIM) 100 MG tablet Take 100 mg by mouth daily.     aluminum-magnesium hydroxide-simethicone (MAALOX ADVANCED) 200-200-20 mg/5 mL Susp Take 30 mL by mouth every 2 (two) hours as needed.     ascorbic acid (VITAMIN C) 500 MG tablet Take 500 mg by mouth 2 (two) times a day.      bisacodyl (DULCOLAX) 10 mg suppository Insert 10 mg into the rectum daily as needed (constipation).      calamine lotion Apply 1 application topically 3 (three) times a day as needed. Apply to affected areas  Topically as needed for itching after the [facility] cream is used      camphor-menthol (SARNA) lotion Apply 1 application topically as needed for itching. Apply to abdomen and chest daily and PRN     carbamide peroxide (DEBROX) 6.5 % otic solution Administer 3 drops into both ears see administration instructions. Instill 3 drops in both ears as needed for ear wax build up twice daily X 3 days. Irrigate using 30 mL syringe and warm water.     cetirizine (ZYRTEC) 10 MG tablet Take 10 mg by mouth daily.     clobetasol (TEMOVATE) 0.05 % cream Apply 1 application topically 2 (two) times a day as needed.     ferrous sulfate 65 mg elemental iron Take 1 tablet by mouth daily with breakfast. 325mg daily     fluticasone (FLONASE) 50 mcg/actuation nasal spray 1 spray into each nostril daily.      furosemide (LASIX) 20 MG tablet Take 40 mg by mouth daily.      guaiFENesin ER (MUCINEX) 600 mg 12 hr tablet Take 600 mg by mouth 2 (two) times a day .           hydrOXYzine (ATARAX) 25 MG tablet Take 25 mg by mouth at bedtime.     ipratropium-albuterol (DUO-NEB) 0.5-2.5 mg/3 mL nebulizer Inhale 3 mL 2 (two) times a day.      magnesium hydroxide (MAGNESIUM HYDROXIDE) 400 mg/5 mL Susp suspension Take 30 mL by mouth 2 (two) times a day  as needed.      meloxicam (MOBIC) 7.5 MG tablet Take 1 tablet (7.5 mg total) by mouth daily.     omeprazole (PRILOSEC) 20 MG capsule Take 20 mg by mouth daily.     ondansetron (ZOFRAN) 4 MG tablet Take 4 mg by mouth every 6 (six) hours as needed for nausea.     protein supplement Pack Take 1 packet by mouth 2 (two) times a day.     senna-docusate (PERICOLACE) 8.6-50 mg tablet Take 1 tablet by mouth 2 (two) times a day. Do not administer if loose stools       ROS he states his feet do not really bother him.  He does not think he has trouble swallowing, is not aware of choking when he swallows or coughing      Social History     Social History Narrative    He is from [place] and is a retired .  He was  in  and has one son alive, one daughter  of breast cancer. His niece is his main caretaker.         Blood pressure 104/61, pulse 89, temperature 98.4  F (36.9  C), resp. rate 19, SpO2 91 %.        Exam  General Appearance: Pleasant, tells jokes ambulates himself in a wheelchair  Very moist vocal quality  Lungs: Soft inspiratory crackles bilaterally   Abdomen: Soft and non tender to palpation  Musculoskeletal :right great toe looks good, the left first and second toe have continuous sores minimal surrounding erythema has teds on both legs, edema  Neuro: Moves all extremities and has facial symmetry, uses his legs to mobilize himself in a wheelchair  Mood: Pleasant    Labs:     Ref Range & Units 18    Hemoglobin 14.0 - 18.0 g/dL 12.6 Abnormally low        Lab Results   Component Value Date     2018    K 4.5 2018     2018    CO2 27 2018    BUN 30 (H) 2018    CREATININE 1.19 2018    CALCIUM 9.0 2018     Lab Results   Component Value Date    WBC 7.0 2018    HGB 12.6 (L) 2018    HCT 37.3 (L) 2018    MCV 91 2018     2018     Lab Results   Component Value Date    TSH 4.40 2018  "    ASSESSMENT / PLAN:    ICD-10-CM    1. PVD (peripheral vascular disease) (H) I73.9  continue wound cares   2. Multiple open wounds of foot S91.309A    \"   3. Essential hypertension I10  under good control   4. Neuropathy (H) G62.9    5. Other dysphagia R13.19  I am not sure if he would benefit cognitively from speech-language pathology but no harm in having him evaluated   6. Primary osteoarthritis involving multiple joints M15.0  Mobic acetaminophen.  No signs of gout but he is on allopurinol maintenance therapy   7. Late onset Alzheimer's disease without behavioral disturbance G30.1  doing well from 89-year-old,    F02.80            Electronically signed by:   Melina Mackenzie MD      "

## 2021-06-23 NOTE — PROGRESS NOTES
[facility]    NAME:  Neel Navarro             :  10/12/1920  MRN: [id number]  CODE STATUS:  DNR/DNI    FACILITY:  [facility]  [[id number]]       ROOM:   323    CHIEF COMPLAINT/REASON FOR VISIT:  Chief Complaint   Patient presents with     Review Of Multiple Medical Conditions     HISTORY OF PRESENT ILLNESS: Neel Navarro is a 89 y.o. male with CAD, Late onset Alzheimer's disease, HLD, and Dysphagia is being seen today in the long term care facility for follow up and management of multiple chronic medical conditions.      Today, patient is seen at the bedside.  Patient has a recurrent rash on upper chest, abdomen, and arms with erythema and pruritis.    He has PVD and has chronic wounds that mostly likely never heal.  1. Left 2nd toe chronic wound that had some necrotic tissue fall off during a dressing change.  The residual wound measures 1.3 cm x 0.7 cm.     2. Area between left great toe has pale moist open area measuring 0.5 cm x 0.5 cm.    3. Left foot bunion measures 2.0 cm x 2.0 cm.  The center of bunion measures 1.2 cm x 1.2 cm.   4.  Right foot great toe wound is stable and measures 1.8 cm x 1.5 cm  Wounds on both feet are malodorous.  Ray denies any pain but does report some discomfort with the dressing changes.  Currently with weekly dressing changes.    Past Medical History:   Diagnosis Date     Allergic dermatitis 2017     Angina pectoris (H) 2003     Benign neoplasm of colon 2006    Overview:  Adenoma  Declines colonoscopy at 89 years old     Bowel obstruction (H)      CAD (coronary artery disease)      Carotid artery narrowing 3/1/2010     Closed nondisplaced fracture of acetabulum with routine healing 2016     Dementia      Dermatitis 2/3/2018     Dysphagia      Enlarged prostate      Essential hypertension      Fall 2016     Gastrointestinal bleeding, upper 2007    Overview:  UGI bleed 2nd to esophagitis +/-  Esophageal angioectasia     Gout      HLD (hyperlipidemia)      Late onset Alzheimer's disease without behavioral disturbance 6/10/2010     Leg weakness 2011     Neuropathy (H) 2010     OP (osteoporosis) 2007    Overview:  Osteopenia on DEXA   R Hip Fx      Other dysphagia 2018     Peripheral blood vessel disorder (H) 3/17/2004    Overview:  Epic      Primary osteoarthritis involving multiple joints 2004    Overview:  Epic      Right bundle branch block      Urinary retention 2/3/2018    Has a chronic peña     Vitamin D deficiency 3/1/2010     Wheezing 3/21/2018     Past Surgical History:   Procedure Laterality Date     REPLACEMENT TOTAL KNEE BILATERAL       Family History   Problem Relation Age of Onset     Other Mother          of old age in her 80s     Heart attack Father          age 78     Heart attack Brother      Breast cancer Daughter      Social History     Socioeconomic History     Marital status:      Spouse name: Not on file     Number of children: Not on file     Years of education: Not on file     Highest education level: Not on file   Social Needs     Financial resource strain: Not on file     Food insecurity - worry: Not on file     Food insecurity - inability: Not on file     Transportation needs - medical: Not on file     Transportation needs - non-medical: Not on file   Occupational History     Occupation:      Comment: retired   Tobacco Use     Smoking status: Former Smoker     Smokeless tobacco: Never Used   Substance and Sexual Activity     Alcohol use: No     Drug use: No     Sexual activity: No   Other Topics Concern     Not on file   Social History Narrative    He is from [place] and is a retired .  He was  in 1962 and has one son alive, one daughter  of breast cancer. His niece is his main caretaker.     Allergies   Allergen Reactions     Ranitidine Unknown     Current Outpatient Medications    Medication Sig Dispense Refill     acetaminophen (TYLENOL) 500 MG tablet Take 1,000 mg by mouth every 6 (six) hours as needed for pain Not to exceed 3 grams in 24 hours.             albuterol (PROVENTIL) 2.5 mg /3 mL (0.083 %) nebulizer solution Take 2.5 mg by nebulization every 4 (four) hours as needed for wheezing.        allopurinol (ZYLOPRIM) 100 MG tablet Take 100 mg by mouth daily.       aluminum-magnesium hydroxide-simethicone (MAALOX ADVANCED) 200-200-20 mg/5 mL Susp Take 30 mL by mouth every 2 (two) hours as needed.       ascorbic acid (VITAMIN C) 500 MG tablet Take 500 mg by mouth 2 (two) times a day.        bisacodyl (DULCOLAX) 10 mg suppository Insert 10 mg into the rectum daily as needed (constipation).        calamine lotion Apply 1 application topically 3 (three) times a day as needed. Apply to affected areas  Topically as needed for itching after the TMC cream is used        camphor-menthol (SARNA) lotion Apply 1 application topically as needed for itching. Apply to abdomen and chest daily and PRN       carbamide peroxide (DEBROX) 6.5 % otic solution Administer 3 drops into both ears see administration instructions. Instill 3 drops in both ears as needed for ear wax build up twice daily X 3 days. Irrigate using 30 mL syringe and warm water.       cetirizine (ZYRTEC) 10 MG tablet Take 10 mg by mouth daily.       clobetasol (TEMOVATE) 0.05 % cream Apply 1 application topically 2 (two) times a day as needed.       ferrous sulfate 65 mg elemental iron Take 1 tablet by mouth 2 (two) times a day with meals. 325mg daily             fluticasone (FLONASE) 50 mcg/actuation nasal spray 1 spray into each nostril daily.        furosemide (LASIX) 20 MG tablet Take 40 mg by mouth daily.        guaiFENesin (ROBITUSSIN) 100 mg/5 mL syrup Take 200 mg by mouth 4 (four) times a day.       hydrOXYzine (ATARAX) 25 MG tablet Take 25 mg by mouth at bedtime.       insulin aspart U-100 (NOVOLOG) 100 unit/mL injection Inject  under the skin 3 (three) times a day before meals 200-299= 4 units  300-399= 6 units  400-499= 10 units  500-999= 12 units.             insulin aspart U-100 (NOVOLOG) 100 unit/mL injection Inject 10 Units under the skin 3 (three) times a day before meals.       insulin glargine (BASAGLAR KWIKPEN) 100 unit/mL (3 mL) pen Inject 12 Units under the skin at bedtime.       ipratropium-albuterol (DUO-NEB) 0.5-2.5 mg/3 mL nebulizer Inhale 3 mL 2 (two) times a day.        magnesium hydroxide (MAGNESIUM HYDROXIDE) 400 mg/5 mL Susp suspension Take 30 mL by mouth 2 (two) times a day as needed.        meloxicam (MOBIC) 7.5 MG tablet Take 1 tablet (7.5 mg total) by mouth daily. 90 tablet 0     omeprazole (PRILOSEC) 20 MG capsule Take 20 mg by mouth daily.       ondansetron (ZOFRAN) 4 MG tablet Take 4 mg by mouth every 6 (six) hours as needed for nausea.       protein supplement Pack Take 1 packet by mouth 2 (two) times a day.       senna-docusate (PERICOLACE) 8.6-50 mg tablet Take 1 tablet by mouth 2 (two) times a day. Do not administer if loose stools (Patient taking differently: Take 1 tablet by mouth daily. Do not administer if loose stools      )  0     spironolactone (ALDACTONE) 25 MG tablet Take 25 mg by mouth daily.       No current facility-administered medications for this visit.        REVIEW OF SYSTEMS:    Currently, no fever, chills, or rigors. Does not have any visual problems.  Hard of hearing.  Denies any chest pain, headaches, palpitations, lightheadedness, dizziness, shortness of breath, or cough. Appetite is good. Denies any GERD symptoms. Denies any difficulty with nausea or vomiting. Dysphagia. Denies any abdominal pain, diarrhea, or constipation. Urinary retention. No insomnia. No active bleeding. Rash on upper chest, abdomen, and bilateral arms.      PHYSICAL EXAMINATION:  Vitals:    01/28/19 1056   BP: 111/60   Pulse: 96   Resp: 20   Temp: 97.6  F (36.4  C)   SpO2: 96%   Weight: (!) 234 lb 12.8 oz (106.5  kg)       GENERAL: Awake, Alert, oriented x3, not in any form of acute distress, answers questions appropriately, follows simple commands, conversant  HEENT: Head is normocephalic with normal hair distribution. No evidence of trauma. Ears: No acute purulent discharge. Eyes: Conjunctivae pink with no scleral jaundice. Nose: Normal mucosa and septum. NECK: Supple with no cervical or supraclavicular lymphadenopathy. Trachea is midline.   CHEST: No tenderness or deformity, no crepitus  LUNG: Clear to auscultation with good chest expansion. There are no crackles or wheezes, normal AP diameter.  BACK: No kyphosis of the thoracic spine. Symmetric, no curvature, ROM normal, no CVA tenderness, no spinal tenderness   CVS: There is good S1  S2, there are no murmurs, rubs, gallops, or heaves, rhythm is regular,  2+ pulses symmetric in all extremities.  ABDOMEN: Globular and soft, nontender to palpation, non distended, no masses, no organomegaly, good bowel sounds, no rebound or guarding, no peritoneal signs.   EXTREMITIES:  Decreased range of motion on both upper and lower extremities, there is no tenderness to palpation, pedal edema, no cyanosis or clubbing, no calf tenderness.  Pulses decreased in lower extremities, delayed cap refill, no joint swelling.  Right/left great toe, left foot bunion, 2nd left toe, area between left great toe and 2nd toe, with open areas.  SKIN: Upper chest, abdomen, and arm rash with erythema an pruritis  NEUROLOGICAL: The patient is oriented to person and place. Strength and sensation are grossly intact. Face is symmetric.    LABS:    Lab Results   Component Value Date    WBC 7.0 07/05/2018    HGB 12.6 (L) 08/09/2018    HCT 37.3 (L) 07/05/2018    MCV 91 07/05/2018     07/05/2018     Results for orders placed or performed in visit on 07/05/18   Basic Metabolic Panel   Result Value Ref Range    Sodium 139 136 - 145 mmol/L    Potassium 4.5 3.5 - 5.0 mmol/L    Chloride 106 98 - 107 mmol/L     CO2 27 22 - 31 mmol/L    Anion Gap, Calculation 6 5 - 18 mmol/L    Glucose 147 (H) 70 - 125 mg/dL    Calcium 9.0 8.5 - 10.5 mg/dL    BUN 30 (H) 8 - 28 mg/dL    Creatinine 1.19 0.70 - 1.30 mg/dL    GFR MDRD Af Amer >60 >60 mL/min/1.73m2    GFR MDRD Non Af Amer 57 (L) >60 mL/min/1.73m2     ASSESSMENT/PLAN:    1. PVD (peripheral vascular disease) (H) - With chronic nonhealing wound. Followed by the vascular clinic as needed.     2. Multiple open wounds of foot - Ongoing, increase dressing changes to twice coffman and continue current treatment plan.   3. Dermatitis - Start Clobetasol 0.05% BID until healed then change to prn. Decrease Vistaril to 10 mg daily   4. Anemia, unspecified type - Continue Ferrous sulfate daily   5. Essential hypertension - Blood pressures are within target range, will continue Losartan and Lasix                   CARE PLAN: The care plan has been reviewed and discussed with patient and nursing staff. No changes made at this time except those noted above.  We will continue to monitor.        Electronically signed by:  Barbara Bolaños CNP

## 2021-06-25 NOTE — PROGRESS NOTES
[facility]    NAME: Neel Navarro  : 10/12/1920           MR# [id number]           Code Status:  DNR  Visit Type: Review Of Multiple Medical Conditions     Facility:  [facility] [[id number]]           Chief Complaint   Patient presents with     Review Of Multiple Medical Conditions       History of Present Illness: Neel is a 89 y.o. male residing at [facility].  He does have:  - Alzheimer's dementia   -Dysphagia : not complying with a thickened liquids as ordered by speech therapy.  He has had episodes of choking on pills.  In the past he has had pneumonia, thought to be suspicious for silent aspiration.  Was question whether or not speech-language pathology should evaluate him again.  However I would wonder if he is choosing to be noncompliant and/or his cognitive abilities are such that he would even benefit from speech-language pathology.  Certainly no harm in having them evaluate  - CAD  -PVD with chronic wounds: has been followed.vascular clinic for.  Wounds are being dressed and observed by nursing staff.  - Essential HTN  -Osteoarthritis, primary: bilateral TKAs  -Prostatic hypertrophy with urinary retention    Patient Active Problem List   Diagnosis     Angina pectoris (H)     Carotid artery narrowing     Benign neoplasm of colon     Late onset Alzheimer's disease without behavioral disturbance     Enlarged prostate     Gout     Leg weakness     Neuropathy (H)     Primary osteoarthritis involving multiple joints     OP (osteoporosis)     Peripheral blood vessel disorder (H)     Vitamin D deficiency     Essential hypertension     Closed nondisplaced fracture of acetabulum with routine healing     Fall     Allergic dermatitis     Dermatitis     Urinary retention     Gastrointestinal bleeding, upper     Wheezing     Other dysphagia       Current Medications:  Current Outpatient Medications   Medication Sig     acetaminophen (TYLENOL) 500  MG tablet Take 1,000 mg by mouth every 6 (six) hours as needed for pain Not to exceed 3 grams in 24 hours.           albuterol (PROVENTIL) 2.5 mg /3 mL (0.083 %) nebulizer solution Take 2.5 mg by nebulization every 4 (four) hours as needed for wheezing.      allopurinol (ZYLOPRIM) 100 MG tablet Take 100 mg by mouth daily.     aluminum-magnesium hydroxide-simethicone (MAALOX ADVANCED) 200-200-20 mg/5 mL Susp Take 30 mL by mouth every 2 (two) hours as needed.     ascorbic acid (VITAMIN C) 500 MG tablet Take 500 mg by mouth 2 (two) times a day.      bisacodyl (DULCOLAX) 10 mg suppository Insert 10 mg into the rectum daily as needed (constipation).      calamine lotion Apply 1 application topically 3 (three) times a day as needed. Apply to affected areas  Topically as needed for itching after the TMC cream is used      camphor-menthol (SARNA) lotion Apply 1 application topically as needed for itching. Apply to abdomen and chest daily and PRN     carbamide peroxide (DEBROX) 6.5 % otic solution Administer 3 drops into both ears see administration instructions. Instill 3 drops in both ears as needed for ear wax build up twice daily X 3 days. Irrigate using 30 mL syringe and warm water.     cetirizine (ZYRTEC) 10 MG tablet Take 10 mg by mouth daily.     clobetasol (TEMOVATE) 0.05 % cream Apply 1 application topically 2 (two) times a day as needed.     ferrous sulfate 65 mg elemental iron Take 1 tablet by mouth daily with breakfast. 325mg daily     fluticasone (FLONASE) 50 mcg/actuation nasal spray 1 spray into each nostril daily.      furosemide (LASIX) 20 MG tablet Take 40 mg by mouth daily.      guaiFENesin ER (MUCINEX) 600 mg 12 hr tablet Take 600 mg by mouth 2 (two) times a day .           hydrOXYzine (ATARAX) 25 MG tablet Take 25 mg by mouth at bedtime.     ipratropium-albuterol (DUO-NEB) 0.5-2.5 mg/3 mL nebulizer Inhale 3 mL 2 (two) times a day.      magnesium hydroxide (MAGNESIUM HYDROXIDE) 400 mg/5 mL Susp  suspension Take 30 mL by mouth 2 (two) times a day as needed.      meloxicam (MOBIC) 7.5 MG tablet Take 1 tablet (7.5 mg total) by mouth daily.     omeprazole (PRILOSEC) 20 MG capsule Take 20 mg by mouth daily.     ondansetron (ZOFRAN) 4 MG tablet Take 4 mg by mouth every 6 (six) hours as needed for nausea.     protein supplement Pack Take 1 packet by mouth 2 (two) times a day.     senna-docusate (PERICOLACE) 8.6-50 mg tablet Take 1 tablet by mouth 2 (two) times a day. Do not administer if loose stools       ROS  Adalberto states his feet do not  bother him.    He does not think he has trouble swallowing, is not aware of choking when he swallows or coughing  Not worried about high sugar levels( high A1c)      Social History     Social History Narrative    He is from [place] and is a retired .  He was  in  and has one son alive, one daughter  of breast cancer. His niece is his main caretaker.         Blood pressure 112/68, pulse 82, temperature 97.8  F (36.6  C), resp. rate 18, weight (!) 235 lb 12.8 oz (107 kg), SpO2 95 %.        Exam  General Appearance: Pleasant, tells jokes ambulates himself in a wheelchair  Very moist vocal quality  Lungs: Soft inspiratory crackles bilaterally, decreased at the posterior bases   Abdomen: Soft, + BS and non tender to palpation  Musculoskeletal :right great toe looks good, the left first and second toe have continuous maceration with serous drainage, minimal surrounding erythema has teds on both legs, 2+ edema  Neuro: Moves all extremities and has facial symmetry, uses his legs to mobilize himself in a wheelchair  Skin: linear scratches over lower half of the abdomen and both forearms  : clear urine in [place]  Mood: Pleasant  Glucose = 111 yesterday before supper    Labs:     Ref Range & Units 18    Hemoglobin A1c: ordered by NP 4.2 - 6.1 % 13.8         Ref Range & Units 18    Hemoglobin 14.0 - 18.0 g/dL 12.6 Abnormally low        Lab  Results   Component Value Date     07/05/2018    K 4.5 07/05/2018     07/05/2018    CO2 27 07/05/2018    BUN 30 (H) 07/05/2018    CREATININE 1.19 07/05/2018    CALCIUM 9.0 07/05/2018       Lab Results   Component Value Date    TSH 4.40 04/12/2018     ASSESSMENT / PLAN:    ICD-10-CM    1. Peripheral vascular disease (H) with chronic left foot wounds I73.9 High sugars not helping healing. Continue wound cares per clinic orders   2. Diabetes Mellitus II with PVD E11.51... On Glargine and Novolog three times a day. Check A1c to see if improved   3. Essential hypertension I10 Good control   4. Other dysphagia R13.19 Noncompliant with texture restrictions   5. Primary osteoarthritis involving multiple joints M15.0 Good pain control with Mobic   6. Chronic idiopathic gout [id number].[id number] No recent flair, on preventive Allopurinol   7. Late onset Alzheimer's disease without behavioral disturbance G30.1 Has niece who is decision maker as his critical thinking is impaired    F02.80            Electronically signed by:   Melina Mackenzie MD

## 2021-06-26 NOTE — PROGRESS NOTES
Progress Notes by Rolanda Barnes NP at 11/9/2018 10:20 AM     Author: Rolanda Barnes NP Service: -- Author Type: Nurse Practitioner    Filed: 11/9/2018  2:22 PM Encounter Date: 11/9/2018 Status: Signed    : Rolanda Barnes NP (Nurse Practitioner)       [facility] Consult Note    Date of Service:  11/9/2018    Requesting Provider: Dr. Melina Mackenzie    Chief Complaint: bilateral foot/toe wounds    History of Present Illness: Neel Navarro is being seen at [facility] today regarding bilateral toe/foot wounds. They arrive to the clinic today with nurse from [facility]; and nephew Too. The patient is a poor historian due to Alzheimer's most of the information is obtained from the nurse and nephew. The patient reports that the right foot wound started Marych 2017 started as a blood blister; this opened and has progressively worsened. They have tried acetic acid soak; gentamycin two times a day; for several months, was treated keflex for extended period of time; the latest treatment is iodosorb and gauze. The left toe wounds started June 2018 started as a blister; scabbed and then opened up; the whole tip of the toe began to slough off; the second toe started as a scratch then blistered; treated for 5 weeks with keflex. He is wearing post op shoes. He did develop some arm and chest rash and thoguht this was due to the antibiotics. He is minimally ambulatory due to hip fracture 2 years ago; he is a 2 assist with a walker can go about 150 feet; otherwise in the wheelchair.  Reports pain of 4/10 to the left 2nd toe only; currently using melaxicam and prn apap for pain. Has used teds as compression in the past. Denies any fevers, chills, or generalized ill feeling. Denies history of cancer. Sleeps in a bed with legs elevated. Denies history of DVT and vein procedures. Positive history of joint replacement and  cellulitis.        Review of Systems:   Constitutional:  negative  for fever, chills or night sweats  EENTM: positive for glasses;  positive [place]  GI:  negative for nausea/vomiting;  negative for constipation  negative diarrhea;  negative for fecal incontinence negative weight loss  :  negative dysuria, negative incontinence; +catheter peña connected to leg bag  MS: patient minimally ambulatory;  does use assistive devices  Cardiac:  positive right BBB, CAD  Respiratory:  positive shortness of breath; +copd  Endocrine:  negative diabetes  Psych:  negative depression/anxiety    Past Medical History:    Past Medical History:   Diagnosis Date   ? Allergic dermatitis 12/13/2017   ? Angina pectoris (H) 11/20/2003   ? Benign neoplasm of colon 2/22/2006    Overview:  Adenoma 2/06 Declines colonoscopy at 89 years old   ? Bowel obstruction (H)    ? CAD (coronary artery disease)    ? Carotid artery narrowing 3/1/2010   ? Closed nondisplaced fracture of acetabulum with routine healing 12/1/2016   ? Dementia    ? Dermatitis 2/3/2018   ? Dysphagia    ? Enlarged prostate    ? Essential hypertension    ? Fall 12/2/2016   ? Gastrointestinal bleeding, upper 9/26/2007    Overview:  UGI bleed 2nd to esophagitis +/- Esophageal angioectasia   ? Gout    ? HLD (hyperlipidemia)    ? Late onset Alzheimer's disease without behavioral disturbance 6/10/2010   ? Leg weakness 6/8/2011   ? Neuropathy (H) 7/28/2010   ? OP (osteoporosis) 1/19/2007    Overview:  Osteopenia on DEXA  1/07 R Hip Fx 1/06   ? Other dysphagia 4/6/2018   ? Peripheral blood vessel disorder (H) 3/17/2004    Overview:  Epic    ? Primary osteoarthritis involving multiple joints 11/17/2004    Overview:  Epic    ? Right bundle branch block    ? Urinary retention 2/3/2018    Has a chronic peña   ? Vitamin D deficiency 3/1/2010   ? Wheezing 3/21/2018        Surgical History:   Past Surgical History:   Procedure Laterality Date   ? REPLACEMENT TOTAL KNEE BILATERAL           Medications:    Current Outpatient Prescriptions:   ?  acetaminophen (TYLENOL) 500 MG tablet, Take 1,000 mg by mouth 3 (three) times a day as needed for pain. Not to exceed 3 grams in 24 hours, Disp: , Rfl:   ?  albuterol (PROVENTIL) 2.5 mg /3 mL (0.083 %) nebulizer solution, Take 2.5 mg by nebulization every 4 (four) hours as needed for wheezing. , Disp: , Rfl:   ?  allopurinol (ZYLOPRIM) 100 MG tablet, Take 100 mg by mouth daily., Disp: , Rfl:   ?  aluminum-magnesium hydroxide-simethicone (MAALOX ADVANCED) 200-200-20 mg/5 mL Susp, Take 30 mL by mouth every 2 (two) hours as needed., Disp: , Rfl:   ?  ascorbic acid (VITAMIN C) 500 MG tablet, Take 500 mg by mouth 2 (two) times a day. , Disp: , Rfl:   ?  bisacodyl (DULCOLAX) 10 mg suppository, Insert 10 mg into the rectum daily as needed (constipation). , Disp: , Rfl:   ?  calamine lotion, Apply 1 application topically 3 (three) times a day as needed. Apply to affected areas  Topically as needed for itching after the TMC cream is used , Disp: , Rfl:   ?  camphor-menthol (SARNA) lotion, Apply 1 application topically as needed for itching. Apply to abdomen and chest daily and PRN, Disp: , Rfl:   ?  carbamide peroxide (DEBROX) 6.5 % otic solution, Administer 3 drops into both ears see administration instructions. Instill 3 drops in both ears as needed for ear wax build up twice daily X 3 days. Irrigate using 30 mL syringe and warm water., Disp: , Rfl:   ?  cetirizine (ZYRTEC) 10 MG tablet, Take 10 mg by mouth daily., Disp: , Rfl:   ?  clobetasol (TEMOVATE) 0.05 % cream, Apply 1 application topically 2 (two) times a day as needed., Disp: , Rfl:   ?  ferrous sulfate 65 mg elemental iron, Take 1 tablet by mouth daily with breakfast. 325mg daily, Disp: , Rfl:   ?  fluticasone (FLONASE) 50 mcg/actuation nasal spray, 1 spray into each nostril daily. , Disp: , Rfl:   ?  furosemide (LASIX) 20 MG tablet, Take 40 mg by mouth daily. , Disp: , Rfl:   ?  guaiFENesin ER (MUCINEX)  600 mg 12 hr tablet, Take 1,200 mg by mouth 2 (two) times a day., Disp: , Rfl:   ?  hydrOXYzine (ATARAX) 25 MG tablet, Take 25 mg by mouth at bedtime., Disp: , Rfl:   ?  ipratropium-albuterol (DUO-NEB) 0.5-2.5 mg/3 mL nebulizer, Inhale 3 mL 2 (two) times a day. , Disp: , Rfl:   ?  losartan (COZAAR) 50 MG tablet, Take 50 mg by mouth daily., Disp: , Rfl:   ?  magnesium hydroxide (MAGNESIUM HYDROXIDE) 400 mg/5 mL Susp suspension, Take 30 mL by mouth 2 (two) times a day as needed. , Disp: , Rfl:   ?  meloxicam (MOBIC) 7.5 MG tablet, Take 1 tablet (7.5 mg total) by mouth daily., Disp: 90 tablet, Rfl: 0  ?  omeprazole (PRILOSEC) 20 MG capsule, Take 20 mg by mouth daily., Disp: , Rfl:   ?  ondansetron (ZOFRAN) 4 MG tablet, Take 4 mg by mouth every 6 (six) hours as needed for nausea., Disp: , Rfl:   ?  protein supplement Pack, Take 1 packet by mouth 2 (two) times a day., Disp: , Rfl:   ?  senna-docusate (PERICOLACE) 8.6-50 mg tablet, Take 1 tablet by mouth 2 (two) times a day. Do not administer if loose stools, Disp: , Rfl: 0    Allergies:   Allergies   Allergen Reactions   ? Ranitidine Unknown       Family history:   Family History   Problem Relation Age of Onset   ? Other Mother       of old age in her 80s   ? Heart attack Father       age 78   ? Heart attack Brother    ? Breast cancer Daughter         Social History:   Social History     Social History   ? Marital status:      Spouse name: N/A   ? Number of children: N/A   ? Years of education: N/A     Occupational History   ?       retired     Social History Main Topics   ? Smoking status: Former Smoker   ? Smokeless tobacco: Never Used   ? Alcohol use No   ? Drug use: No   ? Sexual activity: No     Other Topics Concern   ? Not on file     Social History Narrative    He is from [place] and is a retired .  He was  in  and has one son alive, one daughter  of breast cancer. His niece is his main  "caretaker.        Physical Exam  Vitals: Blood pressure 113/56, pulse 72, temperature 97.7  F (36.5  C), temperature source Oral, resp. rate 18, height 6' 4\" (1.93 m), weight (!) 242 lb (109.8 kg).  General: This is a 89 y.o. male who appears their reported age, not in acute distress  Head: normocephalic, Atraumatic; wearing glasses; non-icteric sclera; no exudate;  Severe hearing loss   Respiratory: Clear throughout all lung fields; unlabored breathing; no cough   Cardiac: Regular, Rate and Rhythm, no murmurs appreciated   Skin: Uniformly warm and dry  Psych: Alert and oriented x4; calm and cooperative throughout exam  Abdomen: Normal bowel sounds. Firm; rounded, symmetric, no guarding or rigidity, nontender with palpation.  No organomegaly or masses palpated.   Extremities: right great toe anterior surface with punched out appearing full thickness necrotic ulcer which measures 1.4x2x0.1cm; no overt bone exposed; trace surrounding erythema; no odor; no pain with palpation. Left hallux valgus deformity; left medial met head with 0.5x0.5cm area of deep brown discoloration; skin intact; left great toe tip with full thickness ulcer which measures 1x0.8x0.1cm; necrotic; eschar covered; no overt bone exposed but close; left 2nd toe anterior surface; base of nail full thickness necrotic wound measures 0.8x1x0.1cm; necrotic; eschar covered; no overt bone exposed but close, strength testing revealed 3/4 to BLEs. Nails are eroded, brittle, dry; absent hair growth below the knee.    Sensation: Decreased to pinprick and light touch in a stocking distribution bilaterally could not follow commands for monofilament testing    Peripheral Vascular: abnormal dorsalis pedis, posterior tibial pulses to bilateral feet , using a handheld doppler these were muted and monophasic in nature.  Good capillary refill. No unusual venous distention. Negative for spider veins, telangiectasias, hemosiderin deposition or hyperpigmentation and " fibrosis or scarring         11/9/18 right great toe        11/9/18 left medial foot; hallux valgus deformity      11/9/18 left 1,2 toes        Circumferential volume measures:    Vasc Edema 11/9/2018   Right just above MTP 26.6   Right Ankle 24.1   Right Widest Calf 40.3   Right Thigh Up 10cm 48   Left - just above MTP 26.5   Left Ankle 25.3   Left Widest Calf 40.3   Left Thigh Up 10cm 51       Ulceration(s)/Wound(s):     Urethral Catheter Latex (Active)       Urethral Catheter Non-latex 16 Fr. (Active)       VASC Wound 11/09/18 Rt great toe (Active)   Pre Size Length 1.4 11/9/2018 10:00 AM   Pre Size Width 2 11/9/2018 10:00 AM   Pre Size Depth 0.1 11/9/2018 10:00 AM   Pre Total Sq cm 2.28 11/9/2018 10:00 AM       VASC Wound 11/09/18 Lt great toe (Active)   Pre Size Length 1 11/9/2018 10:00 AM   Pre Size Width 0.8 11/9/2018 10:00 AM   Pre Size Depth 0.1 11/9/2018 10:00 AM   Pre Total Sq cm 0.8 11/9/2018 10:00 AM       VASC Wound 11/09/18 Lt second toe (Active)   Pre Size Length 0.8 11/9/2018 10:00 AM   Pre Size Width 1 11/9/2018 10:00 AM   Pre Size Depth 0.1 11/9/2018 10:00 AM   Pre Total Sq cm 0.8 11/9/2018 10:00 AM   Description scab partial 11/9/2018 10:00 AM       VASC Wound 11/09/18 Lt hallux (Active)   Pre Size Length 0.6 11/9/2018 10:00 AM   Pre Size Width 0.4 11/9/2018 10:00 AM   Pre Total Sq cm 0.24 11/9/2018 10:00 AM       Laboratory studies:   No results found for: SEDRATE  Lab Results   Component Value Date    CREATININE 1.19 07/05/2018     No results found for: HGBA1C  Lab Results   Component Value Date    BUN 30 (H) 07/05/2018     Lab Results   Component Value Date    ALBUMIN 2.8 (L) 06/08/2016     No results found for: [id number]          Impression:   Right great toe ulcer  Left great toe ulcer  Left 2nd toe ulcer  Left hallux valgus deformity  Pad  Advanced age  Hypoalbuminemia  xerosis    Assessment/Plan:  1. Excisional debridement: avoided today due to suspected PAD; will await ZENOBIA results  first    2. Edema. Elevation; can continue current stockings however ensure that the stockings are loose around the toes; not pulled tightly    3. Treatment: wound treatment will include irrigation and dressings to promote autolytic debridement which will include: goal of care is palliative in nature; I spoke to the nurse and nephew about the serious nature of the wounds, possible osteomyelitis; possible PAD and that the likelihood of these healing is very poor; goal is to keep patient comfortable; try to prevent infeciton and manage drainage. Will continue with the iodosorb; oil emulsion and gauze; will obtain andrew    4. Offloading: continue post op shoes; keep pressure off at all times; will utlize prophylactic mepilex border to the left hallux valgus deformity    5. Nutrition: focus on protein continue all supplements    Patient to return to clinic PRN for re-evaluation. They were instructed to call the clinic sooner with any further questions or concerns. Answered all questions.    Rolanda Barnes DNP, RN, CNP, [facility]  [phone]      This note was electronically signed by Rolanda Barnes